# Patient Record
Sex: FEMALE | Race: WHITE | NOT HISPANIC OR LATINO | Employment: OTHER | URBAN - METROPOLITAN AREA
[De-identification: names, ages, dates, MRNs, and addresses within clinical notes are randomized per-mention and may not be internally consistent; named-entity substitution may affect disease eponyms.]

---

## 2019-04-29 ENCOUNTER — HOSPITAL ENCOUNTER (OUTPATIENT)
Dept: LAB | Facility: HOSPITAL | Age: 49
Discharge: HOME | End: 2019-04-29
Attending: INTERNAL MEDICINE

## 2019-04-29 LAB
ALBUMIN SERPL-MCNC: 4.6 G/DL (ref 3.5–5)
ALBUMIN/GLOB SERPL: 1.6 {RATIO} (ref 1.4–2.6)
ALP SERPL-CCNC: 78 U/L (ref 42–98)
ALT SERPL-CCNC: 21 U/L (ref 10–40)
ANION GAP SERPL CALC-SCNC: 15 MMOL/L (ref 8–19)
AST SERPL-CCNC: 24 U/L (ref 15–50)
BASOPHILS # BLD AUTO: 0.05 10*3/UL (ref 0–0.2)
BASOPHILS NFR BLD AUTO: 0.5 % (ref 0–3)
BILIRUB SERPL-MCNC: 0.5 MG/DL (ref 0.2–1.3)
BUN SERPL-MCNC: 21 MG/DL (ref 5–25)
BUN/CREAT SERPL: 34 {RATIO} (ref 6–20)
CALCIUM SERPL-MCNC: 9.8 MG/DL (ref 8.7–10.4)
CHLORIDE SERPL-SCNC: 99 MMOL/L (ref 99–111)
CONV ABS IMM GRAN: 0.03 10*3/UL (ref 0–0.2)
CONV CO2: 27 MMOL/L (ref 22–32)
CONV IMMATURE GRAN: 0.3 % (ref 0–1.8)
CONV TOTAL PROTEIN: 7.4 G/DL (ref 6.3–8.2)
CREAT UR-MCNC: 0.61 MG/DL (ref 0.5–0.9)
DEPRECATED RDW RBC AUTO: 52.3 FL (ref 36.4–46.3)
EOSINOPHIL # BLD AUTO: 0.59 10*3/UL (ref 0–0.7)
EOSINOPHIL # BLD AUTO: 5.3 % (ref 0–7)
ERYTHROCYTE [DISTWIDTH] IN BLOOD BY AUTOMATED COUNT: 14.6 % (ref 11.7–14.4)
ERYTHROCYTE [SEDIMENTATION RATE] IN BLOOD: 8 MM/H (ref 0–20)
GFR SERPLBLD BASED ON 1.73 SQ M-ARVRAT: >60 ML/MIN/{1.73_M2}
GLOBULIN UR ELPH-MCNC: 2.8 G/DL (ref 2–3.5)
GLUCOSE SERPL-MCNC: 92 MG/DL (ref 65–99)
HBA1C MFR BLD: 13 G/DL (ref 12–16)
HCT VFR BLD AUTO: 40.1 % (ref 37–47)
LYMPHOCYTES # BLD AUTO: 3.87 10*3/UL (ref 1–5)
MCH RBC QN AUTO: 31.5 PG (ref 27–31)
MCHC RBC AUTO-ENTMCNC: 32.4 G/DL (ref 33–37)
MCV RBC AUTO: 97.1 FL (ref 81–99)
MONOCYTES # BLD AUTO: 0.64 10*3/UL (ref 0.2–1.2)
MONOCYTES NFR BLD AUTO: 5.8 % (ref 3–10)
NEUTROPHILS # BLD AUTO: 5.86 10*3/UL (ref 2–8)
NEUTROPHILS NFR BLD AUTO: 53 % (ref 30–85)
NRBC CBCN: 0 % (ref 0–0.7)
OSMOLALITY SERPL CALC.SUM OF ELEC: 287 MOSM/KG (ref 273–304)
PLATELET # BLD AUTO: 304 10*3/UL (ref 130–400)
PMV BLD AUTO: 10.5 FL (ref 9.4–12.3)
POTASSIUM SERPL-SCNC: 4.3 MMOL/L (ref 3.5–5.3)
RBC # BLD AUTO: 4.13 10*6/UL (ref 4.2–5.4)
SODIUM SERPL-SCNC: 137 MMOL/L (ref 135–147)
VARIANT LYMPHS NFR BLD MANUAL: 35.1 % (ref 20–45)
WBC # BLD AUTO: 11.04 10*3/UL (ref 4.8–10.8)

## 2019-05-07 ENCOUNTER — HOSPITAL ENCOUNTER (OUTPATIENT)
Dept: MAMMOGRAPHY | Facility: HOSPITAL | Age: 49
Discharge: HOME | End: 2019-05-07

## 2021-09-17 ENCOUNTER — LAB (OUTPATIENT)
Dept: LAB | Facility: HOSPITAL | Age: 51
End: 2021-09-17

## 2021-09-17 ENCOUNTER — TRANSCRIBE ORDERS (OUTPATIENT)
Dept: ADMINISTRATIVE | Facility: HOSPITAL | Age: 51
End: 2021-09-17

## 2021-09-17 DIAGNOSIS — M13.0 POLYARTHROPATHY: ICD-10-CM

## 2021-09-17 DIAGNOSIS — E55.9 VITAMIN D DEFICIENCY: ICD-10-CM

## 2021-09-17 DIAGNOSIS — Z79.899 ENCOUNTER FOR LONG-TERM (CURRENT) USE OF OTHER MEDICATIONS: ICD-10-CM

## 2021-09-17 DIAGNOSIS — M05.79 SEROPOSITIVE RHEUMATOID ARTHRITIS OF MULTIPLE SITES (HCC): ICD-10-CM

## 2021-09-17 DIAGNOSIS — M05.79 SEROPOSITIVE RHEUMATOID ARTHRITIS OF MULTIPLE SITES (HCC): Primary | ICD-10-CM

## 2021-09-17 LAB
ALBUMIN SERPL-MCNC: 4.3 G/DL (ref 3.5–5.2)
ALBUMIN/GLOB SERPL: 1.3 G/DL
ALP SERPL-CCNC: 92 U/L (ref 39–117)
ALT SERPL W P-5'-P-CCNC: 6 U/L (ref 1–33)
ANION GAP SERPL CALCULATED.3IONS-SCNC: 13.8 MMOL/L (ref 5–15)
AST SERPL-CCNC: 13 U/L (ref 1–32)
BASOPHILS # BLD AUTO: 0.05 10*3/MM3 (ref 0–0.2)
BASOPHILS NFR BLD AUTO: 0.5 % (ref 0–1.5)
BILIRUB SERPL-MCNC: 0.2 MG/DL (ref 0–1.2)
BUN SERPL-MCNC: 12 MG/DL (ref 6–20)
BUN/CREAT SERPL: 34.3 (ref 7–25)
CALCIUM SPEC-SCNC: 9.9 MG/DL (ref 8.6–10.5)
CHLORIDE SERPL-SCNC: 102 MMOL/L (ref 98–107)
CO2 SERPL-SCNC: 24.2 MMOL/L (ref 22–29)
CREAT SERPL-MCNC: 0.35 MG/DL (ref 0.57–1)
DEPRECATED RDW RBC AUTO: 47.4 FL (ref 37–54)
EOSINOPHIL # BLD AUTO: 0.19 10*3/MM3 (ref 0–0.4)
EOSINOPHIL NFR BLD AUTO: 1.8 % (ref 0.3–6.2)
ERYTHROCYTE [DISTWIDTH] IN BLOOD BY AUTOMATED COUNT: 15.2 % (ref 12.3–15.4)
ERYTHROCYTE [SEDIMENTATION RATE] IN BLOOD: 59 MM/HR (ref 0–20)
GFR SERPL CREATININE-BSD FRML MDRD: >150 ML/MIN/1.73
GLOBULIN UR ELPH-MCNC: 3.2 GM/DL
GLUCOSE SERPL-MCNC: 96 MG/DL (ref 65–99)
HCT VFR BLD AUTO: 35.5 % (ref 34–46.6)
HGB BLD-MCNC: 11.7 G/DL (ref 12–15.9)
IMM GRANULOCYTES # BLD AUTO: 0.09 10*3/MM3 (ref 0–0.05)
IMM GRANULOCYTES NFR BLD AUTO: 0.8 % (ref 0–0.5)
LYMPHOCYTES # BLD AUTO: 1.99 10*3/MM3 (ref 0.7–3.1)
LYMPHOCYTES NFR BLD AUTO: 18.4 % (ref 19.6–45.3)
MCH RBC QN AUTO: 28.4 PG (ref 26.6–33)
MCHC RBC AUTO-ENTMCNC: 33 G/DL (ref 31.5–35.7)
MCV RBC AUTO: 86.2 FL (ref 79–97)
MONOCYTES # BLD AUTO: 0.58 10*3/MM3 (ref 0.1–0.9)
MONOCYTES NFR BLD AUTO: 5.4 % (ref 5–12)
NEUTROPHILS NFR BLD AUTO: 7.93 10*3/MM3 (ref 1.7–7)
NEUTROPHILS NFR BLD AUTO: 73.1 % (ref 42.7–76)
NRBC BLD AUTO-RTO: 0 /100 WBC (ref 0–0.2)
PLATELET # BLD AUTO: 457 10*3/MM3 (ref 140–450)
PMV BLD AUTO: 10.5 FL (ref 6–12)
POTASSIUM SERPL-SCNC: 4.2 MMOL/L (ref 3.5–5.2)
PROT SERPL-MCNC: 7.5 G/DL (ref 6–8.5)
RBC # BLD AUTO: 4.12 10*6/MM3 (ref 3.77–5.28)
SODIUM SERPL-SCNC: 140 MMOL/L (ref 136–145)
WBC # BLD AUTO: 10.83 10*3/MM3 (ref 3.4–10.8)

## 2021-09-17 PROCEDURE — 85025 COMPLETE CBC W/AUTO DIFF WBC: CPT

## 2021-09-17 PROCEDURE — 36415 COLL VENOUS BLD VENIPUNCTURE: CPT

## 2021-09-17 PROCEDURE — 80053 COMPREHEN METABOLIC PANEL: CPT

## 2021-09-17 PROCEDURE — 85652 RBC SED RATE AUTOMATED: CPT

## 2021-09-17 PROCEDURE — 82306 VITAMIN D 25 HYDROXY: CPT

## 2021-09-17 PROCEDURE — 86480 TB TEST CELL IMMUN MEASURE: CPT

## 2021-09-18 LAB — 25(OH)D3 SERPL-MCNC: 29.2 NG/ML (ref 30–100)

## 2021-09-22 LAB
GAMMA INTERFERON BACKGROUND BLD IA-ACNC: 0.03 IU/ML
M TB IFN-G BLD-IMP: POSITIVE
M TB IFN-G CD4+ BCKGRND COR BLD-ACNC: 0.1 IU/ML
M TB IFN-G CD4+CD8+ BCKGRND COR BLD-ACNC: 0.43 IU/ML
MITOGEN IGNF BLD-ACNC: >10 IU/ML
QUANTIFERON INCUBATION: ABNORMAL
SERVICE CMNT-IMP: ABNORMAL

## 2022-04-04 ENCOUNTER — TELEPHONE (OUTPATIENT)
Dept: SURGERY | Facility: CLINIC | Age: 52
End: 2022-04-04

## 2022-04-04 NOTE — TELEPHONE ENCOUNTER
----- Message from Annabelle Christianson RN sent at 4/4/2022  4:00 PM EDT -----  Regarding: RE: APPT  If next available is 4/20, that's ok.   ----- Message -----  From: Patricio Mota RegSched Rep  Sent: 4/4/2022   3:49 PM EDT  To: Annabelle Christianson RN  Subject: APPT                                             SAMARA DUMONT REF PT TO DR HERNANDEZ FOR BREAST LUMP AND BLOODY NIPPLE DISCHARGE, RECORDS SCANNED IN [facility], DUE TO DR HERNANDEZ NEXT AVAILABLE APPT IS 4/20/22, PLEASE ADVISE FOR AN APPT.

## 2022-04-05 ENCOUNTER — TELEPHONE (OUTPATIENT)
Dept: SURGERY | Facility: CLINIC | Age: 52
End: 2022-04-05

## 2022-04-05 NOTE — TELEPHONE ENCOUNTER
Patient said her PCP told her to call and let us know that her culture came back positive for an infection, and that her PCP started her on an antibiotic.  She told the HUB she anemic.  She has an appointment with Dr. Beaulieu 04/20/22.

## 2022-04-20 ENCOUNTER — OFFICE VISIT (OUTPATIENT)
Dept: SURGERY | Facility: CLINIC | Age: 52
End: 2022-04-20

## 2022-04-20 VITALS — HEIGHT: 62 IN | WEIGHT: 209 LBS | RESPIRATION RATE: 15 BRPM | BODY MASS INDEX: 38.46 KG/M2

## 2022-04-20 DIAGNOSIS — N63.20 MASS OF LEFT BREAST, UNSPECIFIED QUADRANT: Primary | ICD-10-CM

## 2022-04-20 PROBLEM — F41.9 ANXIETY: Status: ACTIVE | Noted: 2017-08-15

## 2022-04-20 PROBLEM — F31.9 BIPOLAR DISORDER: Status: ACTIVE | Noted: 2017-08-15

## 2022-04-20 PROBLEM — Z78.0 MENOPAUSE: Status: ACTIVE | Noted: 2017-12-01

## 2022-04-20 PROBLEM — E66.9 OBESITY: Status: ACTIVE | Noted: 2017-08-15

## 2022-04-20 PROCEDURE — 99203 OFFICE O/P NEW LOW 30 MIN: CPT | Performed by: SURGERY

## 2022-04-20 RX ORDER — CLINDAMYCIN HYDROCHLORIDE 300 MG/1
CAPSULE ORAL
COMMUNITY
Start: 2022-04-04 | End: 2023-03-10

## 2022-04-20 RX ORDER — DIPHENHYDRAMINE HYDROCHLORIDE 25 MG/1
CAPSULE ORAL
COMMUNITY
Start: 2022-04-18 | End: 2023-01-13

## 2022-04-20 RX ORDER — TIZANIDINE 4 MG/1
TABLET ORAL
COMMUNITY

## 2022-04-20 RX ORDER — FOLIC ACID 1 MG/1
TABLET ORAL
COMMUNITY
End: 2023-01-13

## 2022-04-20 RX ORDER — ARIPIPRAZOLE 2 MG/1
TABLET ORAL
COMMUNITY
Start: 2022-02-18 | End: 2023-01-13 | Stop reason: DRUGHIGH

## 2022-04-20 RX ORDER — ARIPIPRAZOLE 5 MG/1
TABLET ORAL
COMMUNITY

## 2022-04-20 RX ORDER — ISONIAZID 300 MG/1
TABLET ORAL
COMMUNITY
Start: 2022-04-05 | End: 2023-01-13

## 2022-04-20 RX ORDER — ESCITALOPRAM OXALATE 20 MG/1
TABLET ORAL
COMMUNITY
Start: 2022-03-17

## 2022-04-20 RX ORDER — CELECOXIB 200 MG/1
200 CAPSULE ORAL DAILY
COMMUNITY

## 2022-04-20 RX ORDER — ZOLPIDEM TARTRATE 12.5 MG/1
TABLET, FILM COATED, EXTENDED RELEASE ORAL
COMMUNITY
End: 2023-01-13

## 2022-04-20 NOTE — PROGRESS NOTES
Chief Complaint: Breast Mass    Subjective         History of Present Illness  Judith Azevedo is a 52 y.o. female presents to [facility] GENERAL SURGERY to be seen for bloody nipple discharge in the left breast that is coming from multiple ducts.  She would not be eligible for galactogram.  She had a mammogram and ultrasound both which showed no masses or lesions.  Concerned that she has a lump at 6:00 in the left breast however this feels to be dense tissue to me and is not an defined mass that would be amenable to biopsy.  I have recommended an MRI to her given the bloody nipple discharge coming from multiple ducts.    US Breast Left Limited (03/24/2022 10:32)      Mammo Diagnostic Bilateral With CAD (03/24/2022 10:26)    Objective     History reviewed. No pertinent past medical history.    History reviewed. No pertinent surgical history.      Current Outpatient Medications:   •  ARIPiprazole (ABILIFY) 2 MG tablet, , Disp: , Rfl:   •  ARIPiprazole (ABILIFY) 5 MG tablet, aripiprazole 5 mg tablet  Take 1 tablet every day by oral route., Disp: , Rfl:   •  celecoxib (CeleBREX) 200 MG capsule, Take  by mouth., Disp: , Rfl:   •  clindamycin (CLEOCIN) 300 MG capsule, , Disp: , Rfl:   •  escitalopram (LEXAPRO) 20 MG tablet, , Disp: , Rfl:   •  folic acid (FOLVITE) 1 MG tablet, folic acid 1 mg tablet  Take 1 tablet every day by oral route for 30 days., Disp: , Rfl:   •  FOLIC ACID PO, Take  by mouth., Disp: , Rfl:   •  isoniazid (NYDRAZID) 300 MG tablet, , Disp: , Rfl:   •  tiZANidine (ZANAFLEX) 4 MG tablet, tizanidine 4 mg tablet  Take 1 tablet by mouth four times a day as needed, Disp: , Rfl:   •  vitamin B-6 (PYRIDOXINE) 25 MG tablet, , Disp: , Rfl:   •  zolpidem CR (AMBIEN CR) 12.5 MG CR tablet, Take  by mouth., Disp: , Rfl:     No Known Allergies     History reviewed. No pertinent family history.    Social History     Socioeconomic History   • Marital status:    Tobacco Use   • Smoking status:  "Current Every Day Smoker     Packs/day: 0.50   • Smokeless tobacco: Never Used   Vaping Use   • Vaping Use: Never used       Vital Signs:   Resp 15   Ht 157.5 cm (62\")   Wt 94.8 kg (209 lb)   BMI 38.23 kg/m²    Review of Systems    Physical Exam  Vitals and nursing note reviewed.   Constitutional:       Appearance: Normal appearance.   HENT:      Head: Normocephalic and atraumatic.   Eyes:      Extraocular Movements: Extraocular movements intact.      Pupils: Pupils are equal, round, and reactive to light.   Cardiovascular:      Pulses: Normal pulses.   Pulmonary:      Effort: Pulmonary effort is normal. No accessory muscle usage or respiratory distress.   Chest:   Breasts:      Right: No inverted nipple, mass, nipple discharge, skin change, tenderness, axillary adenopathy or supraclavicular adenopathy.      Left: Nipple discharge and tenderness present. No inverted nipple, mass, skin change, axillary adenopathy or supraclavicular adenopathy.        Comments: Bloody nipple discharge multiple ducts of the left breast.  Abdominal:      General: Abdomen is flat.      Palpations: Abdomen is soft.      Tenderness: There is no abdominal tenderness. There is no guarding.   Musculoskeletal:         General: No swelling, tenderness or deformity.      Cervical back: Neck supple.   Lymphadenopathy:      Upper Body:      Right upper body: No supraclavicular or axillary adenopathy.      Left upper body: No supraclavicular or axillary adenopathy.   Skin:     General: Skin is warm and dry.   Neurological:      General: No focal deficit present.      Mental Status: She is alert and oriented to person, place, and time.   Psychiatric:         Mood and Affect: Mood normal.         Thought Content: Thought content normal.          Result Review :               Assessment and Plan    Diagnoses and all orders for this visit:    1. Mass of left breast, unspecified quadrant (Primary)    Will plan for breast MRI with follow-up after is " completed.    Follow Up   Return in about 3 weeks (around 5/11/2022) for Followup after imaging study complete.  Patient was given instructions and counseling regarding her condition or for health maintenance advice. Please see specific information pulled into the AVS if appropriate.         This document has been electronically signed by Lyndsay Beaulieu MD  April 20, 2022 11:07 EDT

## 2022-04-28 ENCOUNTER — TELEPHONE (OUTPATIENT)
Dept: UROLOGY | Facility: CLINIC | Age: 52
End: 2022-04-28

## 2022-04-28 DIAGNOSIS — N64.52 NIPPLE DISCHARGE: Primary | ICD-10-CM

## 2022-04-28 NOTE — TELEPHONE ENCOUNTER
Pt was seen and told she needs MRI.  No one has contacted her and there are no orders in the chart.  Please contact the pt.

## 2022-05-05 ENCOUNTER — TELEPHONE (OUTPATIENT)
Dept: SURGERY | Facility: CLINIC | Age: 52
End: 2022-05-05

## 2022-05-05 NOTE — TELEPHONE ENCOUNTER
Pt is scheduled for an MRI on 5/11. She is claustrophobic and is asking for a premed for anxiety. Pharmacy has been verified.

## 2022-05-10 DIAGNOSIS — F41.9 ANXIETY: Primary | ICD-10-CM

## 2022-05-10 RX ORDER — LORAZEPAM 0.5 MG/1
0.5 TABLET ORAL ONCE
Qty: 1 TABLET | Refills: 0 | Status: SHIPPED | OUTPATIENT
Start: 2022-05-10 | End: 2022-05-10

## 2022-05-11 ENCOUNTER — APPOINTMENT (OUTPATIENT)
Dept: MRI IMAGING | Facility: HOSPITAL | Age: 52
End: 2022-05-11

## 2022-05-20 ENCOUNTER — HOSPITAL ENCOUNTER (OUTPATIENT)
Dept: MRI IMAGING | Facility: HOSPITAL | Age: 52
Discharge: HOME | End: 2022-05-20
Admitting: SURGERY

## 2022-05-20 DIAGNOSIS — N64.52 NIPPLE DISCHARGE: ICD-10-CM

## 2022-05-20 PROCEDURE — 77049 MRI BREAST C-+ W/CAD BI: CPT

## 2022-05-20 PROCEDURE — A9577 INJ MULTIHANCE: HCPCS | Performed by: SURGERY

## 2022-05-20 PROCEDURE — 0 GADOBENATE DIMEGLUMINE 529 MG/ML SOLUTION: Performed by: SURGERY

## 2022-05-20 RX ADMIN — GADOBENATE DIMEGLUMINE 20 ML: 529 INJECTION, SOLUTION INTRAVENOUS at 11:15

## 2022-05-24 PROBLEM — N64.52 NIPPLE DISCHARGE: Status: ACTIVE | Noted: 2022-05-24

## 2022-05-24 RX ORDER — MULTIVIT WITH MINERALS/LUTEIN
TABLET ORAL
COMMUNITY
Start: 2022-04-25 | End: 2023-03-14

## 2022-05-24 RX ORDER — FERROUS SULFATE 325(65) MG
TABLET ORAL
COMMUNITY
Start: 2022-04-25 | End: 2023-03-10

## 2022-05-25 ENCOUNTER — TELEPHONE (OUTPATIENT)
Dept: SURGERY | Facility: CLINIC | Age: 52
End: 2022-05-25

## 2022-06-11 NOTE — PROGRESS NOTES
Chief Complaint: Follow-up    Subjective         History of Present Illness  Judith Azevedo is a 52 y.o. female presents to [facility] GENER[facility] SURGERY to be seen for bloody nipple discharge in the left breast that is coming from multiple ducts.  She would not be eligible for galactogram.  She had a mammogram and ultrasound both which showed no masses or lesions.  Concerned that she has a lump at 6:00 in the left breast however this feels to be dense tissue to me and is not an defined mass that would be amenable to biopsy.  I have recommended an MRI to her given the bloody nipple discharge coming from multiple ducts.    US Breast Left Limited (03/24/2022 10:32)      Mammo Diagnostic Bilateral With CAD (03/24/2022 10:26)    She had MRI and the results are shown below:    Narrative & Impression   PROCEDURE:  MRI BREAST BILATERAL W WO CONTRAST     COMPARISON: [facility], US, US BREAST LEFT LIMITED, 5/07/2019, 14:02.  Other, MG,   DIGITAL DIAG KILEY W/CAD, 4/22/2019, 8:28.  Other, MG, DIGITAL SCREENING W/CAD, 4/17/2018, 15:58.    Other, MG, MAMMO DIAGNOSTIC DIGITAL TOMOSYNTHESIS BILATERAL W CAD, 3/24/2022, 9:18.  Other, MG,   MAMMO SCREENING DIGITAL TOMOSYNTHESIS BILATERAL W CAD, 7/01/2020, 11:52.  Other, US, US BREAST LEFT   LIMITED, 3/24/2022, 9:26.     INDICATIONS:  Left breast niipple greenish-bloody discharge,  (Age >= 40y) with palpable concern, x   ~ six months.  History of rheumatoid arthritis.     CONTRAST:      20ML  Multihance I.V.     TECHNIQUE:    Breast MRI was performed using dynamic intravenous gadolinium infusion, thin sections,   and a dedicated breast coil.  Contrast enhancement analysis was assisted by computer-aided   detection.       AMOUNT OF FIBROGLANDULAR TISSUE:        Almost entirely fat     BACKGROUND PARENCHYMAL ENHANCEMENT:       Minimal; symmetric     FINDINGS:  The contrast bolus is adequate.     No suspicious mass or non mass enhancement is demonstrated above  background parenchymal   enhancement.  A vitamin-E capsule is present at approximately the 6 o'clock position in the   junction of the middle and posterior 1/3 of the left breast to indicate the reported lump.  No   abnormality is seen in this area which is predominately fatty.  No masses are seen.  No duct   ectasia or areas of T1 signal hyper intensity are demonstrated.  No nipple retraction or skin   thickening.  No axillary or internal mammary adenopathy on either side.  The bone marrow signal   characteristics in the visualized anterior bony thorax are within normal limits.     IMPRESSION:  Normal MRI of the breasts with and without contrast.     BIRADS:  DIAGNOSTIC CATEGORY 1--NEGATIVE.       RECOMMENDATION(S):  ROUTINE MAMMOGRAM AND CLINICAL EVALUATION IN 12 MONTHS.       Her symptoms have resolved with abx.     Objective     History reviewed. No pertinent past medical history.    History reviewed. No pertinent surgical history.      Current Outpatient Medications:   •  ARIPiprazole (ABILIFY) 2 MG tablet, , Disp: , Rfl:   •  ARIPiprazole (ABILIFY) 5 MG tablet, aripiprazole 5 mg tablet  Take 1 tablet every day by oral route., Disp: , Rfl:   •  celecoxib (CeleBREX) 200 MG capsule, Take  by mouth., Disp: , Rfl:   •  clindamycin (CLEOCIN) 300 MG capsule, , Disp: , Rfl:   •  escitalopram (LEXAPRO) 20 MG tablet, , Disp: , Rfl:   •  FeroSul 325 (65 Fe) MG tablet, , Disp: , Rfl:   •  folic acid (FOLVITE) 1 MG tablet, folic acid 1 mg tablet  Take 1 tablet every day by oral route for 30 days., Disp: , Rfl:   •  FOLIC ACID PO, Take  by mouth., Disp: , Rfl:   •  isoniazid (NYDRAZID) 300 MG tablet, , Disp: , Rfl:   •  tiZANidine (ZANAFLEX) 4 MG tablet, tizanidine 4 mg tablet  Take 1 tablet by mouth four times a day as needed, Disp: , Rfl:   •  vitamin B-6 (PYRIDOXINE) 25 MG tablet, , Disp: , Rfl:   •  vitamin C (ASCORBIC ACID) 250 MG tablet, , Disp: , Rfl:   •  zolpidem CR (AMBIEN CR) 12.5 MG CR tablet, Take  by mouth.,  "Disp: , Rfl:     No Known Allergies     History reviewed. No pertinent family history.    Social History     Socioeconomic History   • Marital status:    Tobacco Use   • Smoking status: Current Every Day Smoker     Packs/day: 0.50   • Smokeless tobacco: Never Used   Vaping Use   • Vaping Use: Never used       Vital Signs:   Resp 17   Ht 157.5 cm (62\")   Wt 91.6 kg (202 lb)   BMI 36.95 kg/m²    Review of Systems     Physical Exam  Vitals and nursing note reviewed.   Constitutional:       Appearance: Normal appearance.   HENT:      Head: Normocephalic and atraumatic.   Eyes:      Extraocular Movements: Extraocular movements intact.      Pupils: Pupils are equal, round, and reactive to light.   Cardiovascular:      Pulses: Normal pulses.   Pulmonary:      Effort: Pulmonary effort is normal. No accessory muscle usage or respiratory distress.   Chest:   Breasts:      Right: No inverted nipple, mass, nipple discharge, skin change, tenderness, axillary adenopathy or supraclavicular adenopathy.      Left: No inverted nipple, mass, nipple discharge, skin change, tenderness, axillary adenopathy or supraclavicular adenopathy.       Abdominal:      General: Abdomen is flat.      Palpations: Abdomen is soft.      Tenderness: There is no abdominal tenderness. There is no guarding.   Musculoskeletal:         General: No swelling, tenderness or deformity.      Cervical back: Neck supple.   Lymphadenopathy:      Upper Body:      Right upper body: No supraclavicular or axillary adenopathy.      Left upper body: No supraclavicular or axillary adenopathy.   Skin:     General: Skin is warm and dry.   Neurological:      General: No focal deficit present.      Mental Status: She is alert and oriented to person, place, and time.   Psychiatric:         Mood and Affect: Mood normal.         Thought Content: Thought content normal.            Assessment and Plan    Diagnoses and all orders for this visit:    1. Nipple discharge " (Primary)  -     Ambulatory Referral to Plastic Surgery    MRI was normal  Pt will be referred to Plastics for breast reduction    Follow Up   Return if symptoms worsen or fail to improve.  Patient was given instructions and counseling regarding her condition or for health maintenance advice. Please see specific information pulled into the AVS if appropriate.         This document has been electronically signed by Lyndsay Beaulieu MD  June 13, 2022 11:07 EDT

## 2022-06-13 ENCOUNTER — OFFICE VISIT (OUTPATIENT)
Dept: SURGERY | Facility: CLINIC | Age: 52
End: 2022-06-13

## 2022-06-13 VITALS — BODY MASS INDEX: 37.17 KG/M2 | RESPIRATION RATE: 17 BRPM | HEIGHT: 62 IN | WEIGHT: 202 LBS

## 2022-06-13 DIAGNOSIS — N64.52 NIPPLE DISCHARGE: Primary | ICD-10-CM

## 2022-06-13 PROCEDURE — 99212 OFFICE O/P EST SF 10 MIN: CPT | Performed by: SURGERY

## 2022-06-27 NOTE — PROGRESS NOTES
"Chief Complaint  Consult (Bilateral Breast Reduction)    Subjective          History of Present Illness  Judith Azevedo is a 52 y.o. female who presents to [facility] RECONSTRU[facility] SURGERY as a consult from Dr. Beaulieu and would like to discuss breast reduction.  Her current bra size is a 40E cup.  She would like to be a  C cup.  She patient does not have a personal or family history of breast cancer.  Neck, shoulders, and upper back pain present for 20 years.  Conservative treatments of , with little or temporary relief.  Experiences rashes, treats with baby powder.  Trouble sleeping, cannot get comfortable.  Trouble exercising, cannot do activities that she would like to do, generally has to wear many sports bras and has to special order bras. Breast tug and pull which causes pain with any tugging or aerobic activity.  Recommendations for today of breast reduction.    MRI bilateral breast 5/20/22 due to green discharge from left nipple, dx: infected milk duct and completed antibiotic  Mammogram 3/24/2022 benign then MRI was benign   Nipple discharge has stopped and has no problems now. Has completed medications for TB after taking Enbrel. Is not on Enbrel or Methotrexate now.   Allergies: Patient has no known allergies.  Allergies Reconciled.    Review of Systems  All system were reviewed and were negative, except the ones noted above.     Review of Systems     Objective     /82 (BP Location: Left arm, Patient Position: Sitting)   Pulse 61   Temp 98 °F (36.7 °C) (Temporal)   Ht 157.5 cm (62\")   Wt 92.1 kg (203 lb)   BMI 37.13 kg/m²     Body mass index is 37.13 kg/m².    Physical Exam    Physical exam:  Patient awake, alert, oriented  Respirations are non elaborated  Patient is not tachycardic    Breast exam:   Masses: No masses  Nipple Discharge: No nipple discharge  Breast Symmetry: right breast is larger than left  Axillary Lymphadenopathy: No axillary " lymphadenopathy  SN-N (Right Breast): 38  SN-N (Left Breast):37  SN-IMF (Right Breast):10  SN-IMF (Left Breast):10  N-IMF (Right Breast):16  N-IMF (Left Breast):16    Schnur Scale Score: 575  Body surface area is 1.92 meters squared.    Right breast is larger than left breast, mild erythema over IMFs, bilateral shoulder grooving   Result Review :                Assessment and Plan      Diagnoses and all orders for this visit:    1. Macromastia (Primary)             Plan:    • The patient was given literature in regards to the procedure and encouraged to visit the websites of [employer]PS and [employer].  • Pictures obtained.  • We will have the patient obtain pre-operative clearance from rheumatology, Dr. Phillip.   • We discussed the procedure for bilateral breast reduction under general anesthesia as well as the postoperative recover time and the need for limitation of activities.  The risks of surgery were discussed including bleeding, infection, scarring (for which diagrams were drawn), pain, poor healing, loss of skin and or nipple, change in nipple sensation, inability to breast feed, asymmetry, no guarantee of post-operative cup size.    • I think that this patient is an excellent candidate for a breast reduction.  If feel that this procedure is medically necessary to relieve her symptoms.  I anticipate we will remove 575 grams of tissue per each breast. We will contact the insurance company for pre-authorization.    • This patient has my office number to call with any further questions.       OR: 3 hrs under general anesthesia    Consent: bilateral breast reduction    CPT  39303-14- breast reduction    Follow Up     Return in about 1 month (around 7/29/2022) for Video visit to follow up for breast reduction.    Patient was given instructions and counseling regarding her condition. Please see specific information pulled into the AVS if appropriate.     Ree Walls, APRN  06/29/2022

## 2022-06-29 ENCOUNTER — OFFICE VISIT (OUTPATIENT)
Dept: PLASTIC SURGERY | Facility: CLINIC | Age: 52
End: 2022-06-29

## 2022-06-29 VITALS
SYSTOLIC BLOOD PRESSURE: 145 MMHG | HEIGHT: 62 IN | BODY MASS INDEX: 37.36 KG/M2 | HEART RATE: 61 BPM | DIASTOLIC BLOOD PRESSURE: 82 MMHG | WEIGHT: 203 LBS | TEMPERATURE: 98 F

## 2022-06-29 DIAGNOSIS — N62 MACROMASTIA: Primary | ICD-10-CM

## 2022-06-29 PROCEDURE — 99204 OFFICE O/P NEW MOD 45 MIN: CPT | Performed by: NURSE PRACTITIONER

## 2022-08-04 ENCOUNTER — TELEPHONE (OUTPATIENT)
Dept: PLASTIC SURGERY | Facility: CLINIC | Age: 52
End: 2022-08-04

## 2022-08-04 ENCOUNTER — TELEMEDICINE (OUTPATIENT)
Dept: PLASTIC SURGERY | Facility: CLINIC | Age: 52
End: 2022-08-04

## 2022-08-04 DIAGNOSIS — N62 MACROMASTIA: Primary | ICD-10-CM

## 2022-08-04 PROCEDURE — 99214 OFFICE O/P EST MOD 30 MIN: CPT | Performed by: NURSE PRACTITIONER

## 2022-08-04 NOTE — TELEPHONE ENCOUNTER
Could not lvm for patient to set up mychart.  Have called multiple times today for her to get it set up for this visit.  Also sent a message in the [employer] to have her set it up and if she needed help to give us a call.

## 2022-08-04 NOTE — PROGRESS NOTES
Chief Complaint  No chief complaint on file.    Subjective     Mode of Visit: Video  Location of patient: home  Patient consents to use a video/audio connection for medical care today.  The visit included audio and video interaction. No technical issues occurred during this visit.     History of Present Illness  Judith Azevedo is a 52 y.o. female who presents to [facility] REC[facility]STRU[facility] SURGERY as a consult from Dr. Beaulieu and would like to discuss breast reduction.  Her current bra size is a 40E cup.  She would like to be a  C cup.  She patient does not have a personal or family history of breast cancer.  Neck, shoulders, and upper back pain present for 20 years.  Conservative treatments of Nystatin cream prescribed by PCP and baby powder, with little or temporary relief. Trouble sleeping, cannot get comfortable.  Trouble exercising, cannot do activities that she would like to do, generally has to wear many sports bras and has to special order bras. Breast tug and pull which causes pain with any tugging or aerobic activity.  Recommendations for today of breast reduction.    MRI bilateral breast 5/20/22 due to green discharge from left nipple, dx: infected milk duct and completed antibiotic  Mammogram 3/24/2022 benign then MRI was benign   Nipple discharge has stopped and has no problems now. Has completed medications for TB after taking Enbrel. Is not on Enbrel or Methotrexate now.   Allergies: Patient has no known allergies.  Allergies Reconciled.    Review of Systems  All system were reviewed and were negative, except the ones noted above.     Review of Systems     Objective     There were no vitals taken for this visit.    There is no height or weight on file to calculate BMI.    Physical Exam    Physical exam:  Patient awake, alert, oriented  Respirations are non elaborated      Breast exam from 6/29/22    Masses: No masses  Nipple Discharge: No nipple discharge  Breast Symmetry:  right breast is larger than left  Axillary Lymphadenopathy: No axillary lymphadenopathy  SN-N (Right Breast): 38  SN-N (Left Breast):37  SN-IMF (Right Breast):10  SN-IMF (Left Breast):10  N-IMF (Right Breast):16  N-IMF (Left Breast):16    Schnur Scale Score: 575  There is no height or weight on file to calculate BSA.    Right breast is larger than left breast, mild erythema over IMFs, bilateral shoulder grooving   Result Review :                Assessment and Plan      Diagnoses and all orders for this visit:    1. Macromastia (Primary)             Plan:    • The patient was given literature in regards to the procedure and encouraged to visit the websites of [employer]PS and [employer]APS.  • Pictures obtained.  • We will have the patient obtain pre-operative clearance from rheumatology, Dr. Phillip.   • We discussed the procedure for bilateral breast reduction under general anesthesia as well as the postoperative recover time and the need for limitation of activities.  The risks of surgery were discussed including bleeding, infection, scarring (for which diagrams were drawn), pain, poor healing, loss of skin and or nipple, change in nipple sensation, inability to breast feed, asymmetry, no guarantee of post-operative cup size.    • I think that this patient is an excellent candidate for a breast reduction.  If feel that this procedure is medically necessary to relieve her symptoms.  I anticipate we will remove 575 grams of tissue per each breast. We will contact the insurance company for pre-authorization.    • This patient has my office number to call with any further questions.       OR: 3 hrs under general anesthesia    Consent: bilateral breast reduction    CPT  18722-89- breast reduction    Follow Up     No follow-ups on file.    Patient was given instructions and counseling regarding her condition. Please see specific information pulled into the AVS if appropriate.     Ree Walls, APRN  08/04/2022

## 2022-11-23 ENCOUNTER — TELEPHONE (OUTPATIENT)
Dept: PLASTIC SURGERY | Facility: CLINIC | Age: 52
End: 2022-11-23

## 2022-11-23 NOTE — TELEPHONE ENCOUNTER
I left a voicemail for pt to contact me if she wants to schedule with new surgeon. She did not answer and voice mailbox has not been set up.  I tried calling pt x2 but no answer and voicemail box has not been set up.

## 2022-12-15 ENCOUNTER — TRANSCRIBE ORDERS (OUTPATIENT)
Dept: LAB | Facility: HOSPITAL | Age: 52
End: 2022-12-15

## 2022-12-15 ENCOUNTER — LAB (OUTPATIENT)
Dept: LAB | Facility: HOSPITAL | Age: 52
End: 2022-12-15

## 2022-12-15 DIAGNOSIS — E55.9 VITAMIN D DEFICIENCY: ICD-10-CM

## 2022-12-15 DIAGNOSIS — Z79.899 ENCOUNTER FOR LONG-TERM (CURRENT) USE OF OTHER MEDICATIONS: Primary | ICD-10-CM

## 2022-12-15 DIAGNOSIS — M25.50 PAIN IN JOINT, MULTIPLE SITES: ICD-10-CM

## 2022-12-15 DIAGNOSIS — Z79.899 ENCOUNTER FOR LONG-TERM (CURRENT) USE OF OTHER MEDICATIONS: ICD-10-CM

## 2022-12-15 LAB
ALBUMIN SERPL-MCNC: 4.9 G/DL (ref 3.5–5.2)
ALBUMIN/GLOB SERPL: 2 G/DL
ALP SERPL-CCNC: 110 U/L (ref 39–117)
ALT SERPL W P-5'-P-CCNC: 12 U/L (ref 1–33)
ANION GAP SERPL CALCULATED.3IONS-SCNC: 8.3 MMOL/L (ref 5–15)
AST SERPL-CCNC: 17 U/L (ref 1–32)
BASOPHILS # BLD AUTO: 0.05 10*3/MM3 (ref 0–0.2)
BASOPHILS NFR BLD AUTO: 0.4 % (ref 0–1.5)
BILIRUB SERPL-MCNC: 0.3 MG/DL (ref 0–1.2)
BUN SERPL-MCNC: 21 MG/DL (ref 6–20)
BUN/CREAT SERPL: 33.3 (ref 7–25)
CALCIUM SPEC-SCNC: 9.6 MG/DL (ref 8.6–10.5)
CHLORIDE SERPL-SCNC: 103 MMOL/L (ref 98–107)
CO2 SERPL-SCNC: 26.7 MMOL/L (ref 22–29)
CREAT SERPL-MCNC: 0.63 MG/DL (ref 0.57–1)
DEPRECATED RDW RBC AUTO: 64.2 FL (ref 37–54)
EGFRCR SERPLBLD CKD-EPI 2021: 108.2 ML/MIN/1.73
EOSINOPHIL # BLD AUTO: 0.27 10*3/MM3 (ref 0–0.4)
EOSINOPHIL NFR BLD AUTO: 2 % (ref 0.3–6.2)
ERYTHROCYTE [DISTWIDTH] IN BLOOD BY AUTOMATED COUNT: 22.3 % (ref 12.3–15.4)
ERYTHROCYTE [SEDIMENTATION RATE] IN BLOOD: 13 MM/HR (ref 0–20)
GLOBULIN UR ELPH-MCNC: 2.5 GM/DL
GLUCOSE SERPL-MCNC: 104 MG/DL (ref 65–99)
HCT VFR BLD AUTO: 38.3 % (ref 34–46.6)
HGB BLD-MCNC: 11.9 G/DL (ref 12–15.9)
IMM GRANULOCYTES # BLD AUTO: 0.04 10*3/MM3 (ref 0–0.05)
IMM GRANULOCYTES NFR BLD AUTO: 0.3 % (ref 0–0.5)
LYMPHOCYTES # BLD AUTO: 2.71 10*3/MM3 (ref 0.7–3.1)
LYMPHOCYTES NFR BLD AUTO: 20.2 % (ref 19.6–45.3)
MCH RBC QN AUTO: 25.1 PG (ref 26.6–33)
MCHC RBC AUTO-ENTMCNC: 31.1 G/DL (ref 31.5–35.7)
MCV RBC AUTO: 80.6 FL (ref 79–97)
MONOCYTES # BLD AUTO: 0.53 10*3/MM3 (ref 0.1–0.9)
MONOCYTES NFR BLD AUTO: 4 % (ref 5–12)
NEUTROPHILS NFR BLD AUTO: 73.1 % (ref 42.7–76)
NEUTROPHILS NFR BLD AUTO: 9.81 10*3/MM3 (ref 1.7–7)
NRBC BLD AUTO-RTO: 0 /100 WBC (ref 0–0.2)
PLATELET # BLD AUTO: 387 10*3/MM3 (ref 140–450)
PMV BLD AUTO: 10.6 FL (ref 6–12)
POTASSIUM SERPL-SCNC: 4.4 MMOL/L (ref 3.5–5.2)
PROT SERPL-MCNC: 7.4 G/DL (ref 6–8.5)
RBC # BLD AUTO: 4.75 10*6/MM3 (ref 3.77–5.28)
SODIUM SERPL-SCNC: 138 MMOL/L (ref 136–145)
WBC NRBC COR # BLD: 13.41 10*3/MM3 (ref 3.4–10.8)

## 2022-12-15 PROCEDURE — 85025 COMPLETE CBC W/AUTO DIFF WBC: CPT

## 2022-12-15 PROCEDURE — 85652 RBC SED RATE AUTOMATED: CPT

## 2022-12-15 PROCEDURE — 36415 COLL VENOUS BLD VENIPUNCTURE: CPT

## 2022-12-15 PROCEDURE — 80053 COMPREHEN METABOLIC PANEL: CPT

## 2023-01-13 ENCOUNTER — PREP FOR SURGERY (OUTPATIENT)
Dept: OTHER | Facility: HOSPITAL | Age: 53
End: 2023-01-13
Payer: COMMERCIAL

## 2023-01-13 ENCOUNTER — OFFICE VISIT (OUTPATIENT)
Dept: PLASTIC SURGERY | Facility: CLINIC | Age: 53
End: 2023-01-13
Payer: COMMERCIAL

## 2023-01-13 VITALS
WEIGHT: 215.4 LBS | TEMPERATURE: 98.1 F | HEART RATE: 70 BPM | OXYGEN SATURATION: 98 % | SYSTOLIC BLOOD PRESSURE: 127 MMHG | DIASTOLIC BLOOD PRESSURE: 80 MMHG | HEIGHT: 62 IN | BODY MASS INDEX: 39.64 KG/M2

## 2023-01-13 DIAGNOSIS — N62 MACROMASTIA: Primary | ICD-10-CM

## 2023-01-13 DIAGNOSIS — Z01.818 PRE-OPERATIVE EXAMINATION: ICD-10-CM

## 2023-01-13 PROCEDURE — 99203 OFFICE O/P NEW LOW 30 MIN: CPT | Performed by: SURGERY

## 2023-01-13 RX ORDER — CEFAZOLIN SODIUM 2 G/100ML
2 INJECTION, SOLUTION INTRAVENOUS ONCE
Status: CANCELLED | OUTPATIENT
Start: 2023-01-13 | End: 2023-01-13

## 2023-01-13 RX ORDER — MEDROXYPROGESTERONE ACETATE 150 MG/ML
INJECTION, SUSPENSION INTRAMUSCULAR
COMMUNITY
Start: 2022-12-19 | End: 2023-03-10

## 2023-01-13 NOTE — PROGRESS NOTES
"Chief Complaint  Consult (Consult Breast Reduction)    Subjective       History of Present Illness  Judith Azevedo is a 52 y.o. female who presents to [facility] as a consult from Dr. Beaulieu and would like to discuss breast reduction.  Her current bra size is a 40E cup.  She would like to be a  B cup.  She patient does not have a personal or family history of breast cancer.  Neck, shoulders, and upper back pain present for 20 years.  Conservative treatments of Nystatin cream prescribed by PCP and baby powder, with little or temporary relief. Trouble sleeping, cannot get comfortable.  Trouble exercising, cannot do activities that she would like to do, generally has to wear many sports bras and has to special order bras. Breast tug and pull which causes pain with any tugging or aerobic activity.  Recommendations for today of breast reduction.    MRI bilateral breast 5/20/22 due to green discharge from left nipple, dx: infected milk duct and completed antibiotic  Mammogram 3/24/2022 benign then MRI was benign   Nipple discharge has stopped and has no problems now. Has completed medications for TB after taking Enbrel. Is not on Enbrel or Methotrexate now.     Patient has been smoking 1/2 PPD but states she is quitting today.     Allergies: Patient has no known allergies.  Allergies Reconciled.    Review of Systems  All system were reviewed and were negative, except the ones noted above.     Review of Systems     Objective     /80 (BP Location: Left arm)   Pulse 70   Temp 98.1 °F (36.7 °C) (Temporal)   Ht 157.5 cm (62\")   Wt 97.7 kg (215 lb 6.4 oz)   SpO2 98%   BMI 39.40 kg/m²     Body mass index is 39.4 kg/m².    Physical Exam    Physical exam:  Patient awake, alert, oriented  Respirations are non elaborated      Breast exam:    Breast Measurements:    Right Left   A: Sternal notch to nipple distance (cm) 22 22   B: Midsternum to nipple distance (cm) 36 36 "   C: Midclavicle to nipple distance (cm)       D: Nipple to inframammary fold (cm) 15 14   E: Base width (cm) 17 17   F: Inframammary distance (cm)        Right Left   Areolar diameter (cm) 6 6   Nipple diameter (cm)       Superior tissue pinch test (cm)     Breast ptosis (grade 0-3) 3 3       Estimated tissue to be removed: 700 R, 600 L    Body surface area is 1.97 meters squared.    Right breast is larger than left breast, mild erythema over IMFs, bilateral shoulder grooving .  Patient with loss of upper pole fullness.  She has stretch marks that extend onto the upper pole of the breast.  She is hyperpigmentation along the inframammary fold consistent with chronic irritation and maceration.  No discrete breast masses but she has dense breast tissue.  No axillary lymphadenopathy.  No nipple discharge or drainage.                 Assessment and Plan    Discussed smoking with patient and how surgery will not be performed as long as nicotine test come back positive. Advised pt she will be tested before and day of surgery.   Discussed scarring and healing process. Pictures were done in clinic today.      Diagnoses and all orders for this visit:    1. Macromastia (Primary)    2. Pre-operative examination             • Plan:   • Bilateral breast reduction  • Removal of more than 700 g from the right side  • Removal more than 600 g from the left side  •   •   • The patient was given literature in regards to the procedure and encouraged to visit the websites of [employer] and [employer].  • Pictures obtained.  • We will have the patient send the report or undergo a pre-operative mammogram.  • We will have the patient obtain pre-operative clearance.  • We discussed the procedure for bilateral breast reduction under general anesthesia as well as the postoperative recover time and the need for limitation of activities.  The risks of surgery were discussed including bleeding, infection, scarring (for which diagrams were drawn), pain,  poor healing, loss of skin and or nipple, change in nipple sensation, inability to breast feed, asymmetry, no guarantee of post-operative cup size.    • I think that this patient is an excellent candidate for a breast reduction.  I feel that this procedure is medically necessary to relieve her symptoms.   • We will contact the insurance company for pre-authorization.    • This patient has my office number to call with any further questions.   • OR: 3 hrs under general anesthesia  •   • Consent: bilateral breast reduction  •   • CPT  46536-03- breast reduction        I spent 25 minutes caring for Judith on this date of service. This time includes time spent by me in the following activities:performing a medically appropriate examination and/or evaluation , counseling and educating the patient/family/caregiver, documenting information in the medical record, and care coordination        Scribed by David Paez MD, acting as a scribe for David Paez MD, 01/13/23 08:56 EST.  David Paez MD's signature on the note affirms that the note adequately documents the care provided.        Discussion with patient regarding her breast reduction.  She understands that nicotine use will increase the risk for wound complications.  Even being a recent ex smoker will increase the risk versus a non-smoker.  Patient encouraged to quit as soon as possible.  She understands she cannot be guaranteed a specific cup size.  The possibility of altered or loss nipple sensation was discussed with the patient.  The axillary roll is not breast tissue.  It will not be reduced as part of the operation.  This could be addressed through either liposuction or weight loss.  Patient will think about her options, however she is leaning towards weight loss.  Patient is looking forward to being more active once her breast are more normal size.  Consent:    Follow Up     Return for pre op once surgery schd.     Scribed by  Roberta Wang, acting as a scribe for David Paez MD, 01/13/23 08:40 EST.  David Paez MD's signature on the note affirms that the note adequately documents the care provided.      Patient was given instructions and counseling regarding her condition. Please see specific information pulled into the AVS if appropriate.     David Paez MD  01/13/2023

## 2023-01-25 ENCOUNTER — TELEPHONE (OUTPATIENT)
Dept: PLASTIC SURGERY | Facility: CLINIC | Age: 53
End: 2023-01-25
Payer: COMMERCIAL

## 2023-01-25 NOTE — TELEPHONE ENCOUNTER
Called & spoke with patient advised her that she can just walk in for the cotinine test at the lab they are not scheduled. She verbalized understanding.

## 2023-01-25 NOTE — TELEPHONE ENCOUNTER
Caller: Judith Azevedo    Relationship to patient: SELF     Best call back number: [phone]    Patient is needing: PATIENT IS NEEDING TO SCHEDULE NICOTINE TEST FOR 02.15.23 INSTEAD OF 02.13.23 DUE TO PRIOR DR APPOINTMENT         ”

## 2023-02-15 ENCOUNTER — LAB (OUTPATIENT)
Dept: LAB | Facility: HOSPITAL | Age: 53
End: 2023-02-15
Payer: COMMERCIAL

## 2023-02-15 DIAGNOSIS — Z01.818 PRE-OPERATIVE EXAMINATION: ICD-10-CM

## 2023-02-15 LAB — COTININE UR-MCNC: NEGATIVE NG/ML

## 2023-02-15 PROCEDURE — G0480 DRUG TEST DEF 1-7 CLASSES: HCPCS

## 2023-03-07 PROBLEM — N62 MACROMASTIA: Status: ACTIVE | Noted: 2023-03-07

## 2023-03-07 NOTE — PROGRESS NOTES
"Pre-op Exam (Pre Op 3/15/23)            History of Present Illness  Judith Azevedo is a 52 y.o. female who presents to [facility] for Pre-Operative Examination for bilateral breast reduction scheduled for 3/15/23.    Pt presents today for Pre-op for BBR on 3/15/23.  Pt states she would like to be a C cup after surgery.  She understands she cannot be guaranteed a specific cup size.    Subjective        Patient has no known allergies.  Allergies Reconciled.    Review of Systems   All review of system has been reviewed and it  is negative except the ones note above.     Objective     /86 (BP Location: Right arm, Patient Position: Sitting, Cuff Size: Adult)   Pulse 68   Temp 97.8 °F (36.6 °C) (Temporal)   Ht 157.5 cm (62\")   Wt 100 kg (221 lb)   SpO2 95%   BMI 40.42 kg/m²     Body mass index is 40.42 kg/m².    Physical Exam   Cardiovascular: Normal rate.     Pulmonary/Chest  Effort normal.   Breast  No interval change on breast examination    Result Review :                Assessment and Plan      Diagnoses and all orders for this visit:    1. Pre-operative examination (Primary)  -     cephalexin (KEFLEX) 500 MG capsule; Take 1 capsule by mouth 4 (Four) Times a Day for 5 days.  Dispense: 20 capsule; Refill: 0  -     promethazine (PHENERGAN) 25 MG tablet; Take 1 tablet by mouth Every 6 (Six) Hours As Needed for Nausea or Vomiting.  Dispense: 20 tablet; Refill: 0  -     oxyCODONE-acetaminophen (Percocet) 5-325 MG per tablet; Take 1 tablet by mouth Every 6 (Six) Hours As Needed for Moderate Pain.  Dispense: 28 tablet; Refill: 0  -     Nicotine Screen, Urine - Urine, Clean Catch; Future    Symptomatic macromastia    Plan:  Bilateral breast reduction  • Removal of more than 700 g from the right side  • Removal more than 600 g from the left side      Discussed downtime after surgery.  Discussed will need to take it easy for the first 3 days up to a week.  Discussed " will need sports bra that latches in the front; will wear day/night for the first month.  Discussed surgery time (2 hrs)  Post medications sent into pharmacy at today's appt  Pt aware of nicotine test due to do today after appt.  Advised pt will have drains and will remove in couple of days after surgery.  Discussed breast will normalize over months post op.  Discussed swelling will take up to 3 months to dissolve.  Discussed final shape for breast is up to a year.  She understands the axillary roll will not be reduced as part of this operation, is not breast tissue.  Patient states she is planning on losing weight and this will help the axillary roll.    Scribed by Roberta Wang, acting as a scribe for David Paez MD, 03/10/23 10:36 EST.  David Paez MD's signature on the note affirms that the note adequately documents the care provided.    Follow Up     Post op 3/23/23    Return post op, one week after.    Patient was given instructions and counseling regarding her condition. Please see specific information pulled into the AVS if appropriate.     David Paez MD  03/10/2023

## 2023-03-10 ENCOUNTER — LAB (OUTPATIENT)
Dept: LAB | Facility: HOSPITAL | Age: 53
End: 2023-03-10
Payer: COMMERCIAL

## 2023-03-10 ENCOUNTER — OFFICE VISIT (OUTPATIENT)
Dept: PLASTIC SURGERY | Facility: CLINIC | Age: 53
End: 2023-03-10
Payer: COMMERCIAL

## 2023-03-10 VITALS
WEIGHT: 221 LBS | DIASTOLIC BLOOD PRESSURE: 86 MMHG | TEMPERATURE: 97.8 F | OXYGEN SATURATION: 95 % | HEIGHT: 62 IN | BODY MASS INDEX: 40.67 KG/M2 | SYSTOLIC BLOOD PRESSURE: 137 MMHG | HEART RATE: 68 BPM

## 2023-03-10 DIAGNOSIS — Z01.818 PRE-OPERATIVE EXAMINATION: Primary | ICD-10-CM

## 2023-03-10 DIAGNOSIS — Z01.818 PRE-OPERATIVE EXAMINATION: ICD-10-CM

## 2023-03-10 LAB — COTININE UR-MCNC: NEGATIVE NG/ML

## 2023-03-10 PROCEDURE — G0480 DRUG TEST DEF 1-7 CLASSES: HCPCS

## 2023-03-10 PROCEDURE — 1159F MED LIST DOCD IN RCRD: CPT | Performed by: SURGERY

## 2023-03-10 PROCEDURE — 99213 OFFICE O/P EST LOW 20 MIN: CPT | Performed by: SURGERY

## 2023-03-10 PROCEDURE — 1160F RVW MEDS BY RX/DR IN RCRD: CPT | Performed by: SURGERY

## 2023-03-10 RX ORDER — CETIRIZINE HYDROCHLORIDE 10 MG/1
TABLET ORAL
COMMUNITY
Start: 2023-02-15 | End: 2023-03-14

## 2023-03-10 RX ORDER — PROMETHAZINE HYDROCHLORIDE 25 MG/1
25 TABLET ORAL EVERY 6 HOURS PRN
Qty: 20 TABLET | Refills: 0 | Status: SHIPPED | OUTPATIENT
Start: 2023-03-10 | End: 2023-03-14

## 2023-03-10 RX ORDER — OXYCODONE HYDROCHLORIDE AND ACETAMINOPHEN 5; 325 MG/1; MG/1
1 TABLET ORAL EVERY 6 HOURS PRN
Qty: 28 TABLET | Refills: 0 | Status: SHIPPED | OUTPATIENT
Start: 2023-03-10 | End: 2023-03-14

## 2023-03-10 RX ORDER — MEDROXYPROGESTERONE ACETATE 150 MG/ML
INJECTION, SUSPENSION INTRAMUSCULAR
COMMUNITY
End: 2023-03-14

## 2023-03-10 RX ORDER — CETIRIZINE HYDROCHLORIDE 10 MG/1
TABLET ORAL EVERY 24 HOURS
COMMUNITY

## 2023-03-10 RX ORDER — CEPHALEXIN 500 MG/1
500 CAPSULE ORAL 4 TIMES DAILY
Qty: 20 CAPSULE | Refills: 0 | Status: SHIPPED | OUTPATIENT
Start: 2023-03-10 | End: 2023-03-15

## 2023-03-14 RX ORDER — PROMETHAZINE HYDROCHLORIDE 25 MG/1
25 TABLET ORAL EVERY 6 HOURS PRN
COMMUNITY

## 2023-03-14 RX ORDER — OXYCODONE HYDROCHLORIDE AND ACETAMINOPHEN 5; 325 MG/1; MG/1
1 TABLET ORAL EVERY 6 HOURS PRN
COMMUNITY

## 2023-03-14 NOTE — PRE-PROCEDURE INSTRUCTIONS
PATIENT INSTRUCTED TO BE:    - NPO AFTER MIDNIGHT EXCEPT CAN HAVE CLEAR LIQUIDS 2 HOURS PRIOR TO SURGERY ARRIVAL TIME     - TO HOLD ALL VITAMINS, SUPPLEMENTS, NSAIDS FOR ONE WEEK PRIOR TO THEIR SURGICAL PROCEDURE    - INSTRUCTED PT TO USE SURGICAL SOAP 1 TIME THE NIGHT PRIOR TO SURGERY OR THE AM OF SURGERY.   USE SOAP FROM NECK TO TOES AVOID THEIR FACE, HAIR, AND PRIVATE PARTS. INSTRUCTED NO LOTIONS, JEWELRY, PIERCINGS, OR DEODORANT DAY OF SURGERY    - IF DIABETIC, CHECK BLOOD GLUCOSE IF LESS THAN 70 CALL PREOP AREA -AM OF SURGERY     - INSTRUCTED TO TAKE THE FOLLOWING MEDICATIONS THE DAY OF SURGERY:     ABILIFY, ZYRTEC, LEXAPRO, ZANAFLEX     PATIENT VERBALIZED UNDERSTANDING

## 2023-03-15 ENCOUNTER — ANESTHESIA (OUTPATIENT)
Dept: PERIOP | Facility: HOSPITAL | Age: 53
End: 2023-03-15
Payer: COMMERCIAL

## 2023-03-15 ENCOUNTER — TELEPHONE (OUTPATIENT)
Dept: PLASTIC SURGERY | Facility: CLINIC | Age: 53
End: 2023-03-15
Payer: COMMERCIAL

## 2023-03-15 ENCOUNTER — HOSPITAL ENCOUNTER (OUTPATIENT)
Facility: HOSPITAL | Age: 53
Setting detail: HOSPITAL OUTPATIENT SURGERY
Discharge: HOME | End: 2023-03-15
Attending: SURGERY | Admitting: SURGERY
Payer: COMMERCIAL

## 2023-03-15 ENCOUNTER — ANESTHESIA EVENT (OUTPATIENT)
Dept: PERIOP | Facility: HOSPITAL | Age: 53
End: 2023-03-15
Payer: COMMERCIAL

## 2023-03-15 VITALS
BODY MASS INDEX: 40.33 KG/M2 | RESPIRATION RATE: 18 BRPM | HEART RATE: 68 BPM | DIASTOLIC BLOOD PRESSURE: 89 MMHG | OXYGEN SATURATION: 98 % | SYSTOLIC BLOOD PRESSURE: 137 MMHG | WEIGHT: 219.14 LBS | TEMPERATURE: 98.6 F | HEIGHT: 62 IN

## 2023-03-15 DIAGNOSIS — N62 MACROMASTIA: ICD-10-CM

## 2023-03-15 LAB — COTININE UR-MCNC: POSITIVE NG/ML

## 2023-03-15 PROCEDURE — G0480 DRUG TEST DEF 1-7 CLASSES: HCPCS | Performed by: SURGERY

## 2023-03-15 PROCEDURE — G0463 HOSPITAL OUTPT CLINIC VISIT: HCPCS | Performed by: SURGERY

## 2023-03-15 PROCEDURE — 93005 ELECTROCARDIOGRAM TRACING: CPT | Performed by: ANESTHESIOLOGY

## 2023-03-15 RX ORDER — SODIUM CHLORIDE, SODIUM LACTATE, POTASSIUM CHLORIDE, CALCIUM CHLORIDE 600; 310; 30; 20 MG/100ML; MG/100ML; MG/100ML; MG/100ML
9 INJECTION, SOLUTION INTRAVENOUS CONTINUOUS PRN
Status: DISCONTINUED | OUTPATIENT
Start: 2023-03-15 | End: 2023-03-15 | Stop reason: HOSPADM

## 2023-03-15 RX ORDER — ETANERCEPT 50 MG/ML
50 SOLUTION SUBCUTANEOUS WEEKLY
COMMUNITY

## 2023-03-15 RX ORDER — CEFAZOLIN SODIUM 2 G/100ML
2 INJECTION, SOLUTION INTRAVENOUS ONCE
Status: DISCONTINUED | OUTPATIENT
Start: 2023-03-15 | End: 2023-03-15 | Stop reason: HOSPADM

## 2023-03-15 RX ORDER — MIDAZOLAM HYDROCHLORIDE 2 MG/2ML
2 INJECTION, SOLUTION INTRAMUSCULAR; INTRAVENOUS ONCE
Status: DISCONTINUED | OUTPATIENT
Start: 2023-03-15 | End: 2023-03-15 | Stop reason: HOSPADM

## 2023-03-15 RX ORDER — ACETAMINOPHEN 500 MG
1000 TABLET ORAL ONCE
Status: DISCONTINUED | OUTPATIENT
Start: 2023-03-15 | End: 2023-03-15 | Stop reason: HOSPADM

## 2023-03-15 NOTE — ANESTHESIA PREPROCEDURE EVALUATION
Anesthesia Evaluation     Patient summary reviewed and Nursing notes reviewed                Airway   Mallampati: I  TM distance: >3 FB  Neck ROM: full  No difficulty expected  Dental      Pulmonary - negative pulmonary ROS and normal exam    breath sounds clear to auscultation  Cardiovascular - normal exam    Rhythm: regular  Rate: normal    (+) valvular problems/murmurs,       Neuro/Psych  (+) psychiatric history Bipolar and Anxiety,    GI/Hepatic/Renal/Endo    (+) morbid obesity,      Musculoskeletal     Abdominal    Substance History - negative use     OB/GYN negative ob/gyn ROS         Other   arthritis,                      Anesthesia Plan    ASA 3     general     intravenous induction     Anesthetic plan, risks, benefits, and alternatives have been provided, discussed and informed consent has been obtained with: patient.        CODE STATUS:

## 2023-03-15 NOTE — TELEPHONE ENCOUNTER
Called patient first attempt to schedule a re-eval for surgery plan. Patient did not answer and voicemail is not set up.

## 2023-03-16 LAB — QT INTERVAL: 402 MS

## 2023-03-27 NOTE — PROGRESS NOTES
"Follow-up (Discuss surgery plan)            History of Present Illness  Judith Azevedo is a 52 y.o. female who presents to [facility] for Pre-Operative Examination for bilateral breast reduction scheduled for 3/15/23.      Pt was nicotine positive day of surgery. Pt is here today to discuss plan.    Pt states she was a t a family reunion before her surgery date and believes 2nd hand smoke made her test positive day of surgery.  Patient has 2 prior negative urine nicotine tests.  Patient states she has been compliant with smoking cessation.      Subjective        Patient has no known allergies.  Allergies Reconciled.    Review of Systems   All review of system has been reviewed and it  is negative except the ones note above.     Objective     /88 (BP Location: Left arm, Patient Position: Sitting, Cuff Size: Adult)   Pulse 71   Temp 98.4 °F (36.9 °C) (Temporal)   Ht 157.5 cm (62.01\")   Wt 99.8 kg (220 lb)   SpO2 97%   BMI 40.23 kg/m²     Body mass index is 40.23 kg/m².    Physical Exam   Cardiovascular: Normal rate.     Pulmonary/Chest  Effort normal.   Breast  No interval change on breast examination    Result Review :                Assessment and Plan      Diagnoses and all orders for this visit:    1. Pre-op evaluation (Primary)  -     Nicotine Screen, Urine - Urine, Clean Catch; Future    2. Macromastia    Symptomatic macromastia    Plan:  Bilateral breast reduction  • Removal of more than 700 g from the right side  • Removal more than 600 g from the left side      Discussed with pt if Nicotine urine test comes back negative today will reschedule surgery.  Pt is aware will be retested again day of surgery.  Discussed the negative impact nicotine causes.  Reiterated the reason that the nicotine is still harmful to wound healing.  That the nicotine causes vasoconstriction and consequently there is inadequate blood flow to the wound edges immediately " postoperative.  This causes fat and skin necrosis and significantly increase the risk for wound complications.        Follow Up     Post op 3/23/23    Return for pre op once surgery is scheduled.    Patient was given instructions and counseling regarding her condition. Please see specific information pulled into the AVS if appropriate.     David Paez MD  04/06/2023

## 2023-04-06 ENCOUNTER — LAB (OUTPATIENT)
Dept: LAB | Facility: HOSPITAL | Age: 53
End: 2023-04-06
Payer: COMMERCIAL

## 2023-04-06 ENCOUNTER — OFFICE VISIT (OUTPATIENT)
Dept: PLASTIC SURGERY | Facility: CLINIC | Age: 53
End: 2023-04-06
Payer: COMMERCIAL

## 2023-04-06 VITALS
DIASTOLIC BLOOD PRESSURE: 88 MMHG | HEART RATE: 71 BPM | SYSTOLIC BLOOD PRESSURE: 144 MMHG | OXYGEN SATURATION: 97 % | WEIGHT: 220 LBS | BODY MASS INDEX: 40.48 KG/M2 | TEMPERATURE: 98.4 F | HEIGHT: 62 IN

## 2023-04-06 DIAGNOSIS — Z01.818 PRE-OP EVALUATION: ICD-10-CM

## 2023-04-06 DIAGNOSIS — N62 MACROMASTIA: ICD-10-CM

## 2023-04-06 DIAGNOSIS — Z01.818 PRE-OP EVALUATION: Primary | ICD-10-CM

## 2023-04-06 LAB — COTININE UR-MCNC: NEGATIVE NG/ML

## 2023-04-06 PROCEDURE — 1160F RVW MEDS BY RX/DR IN RCRD: CPT | Performed by: SURGERY

## 2023-04-06 PROCEDURE — 99213 OFFICE O/P EST LOW 20 MIN: CPT | Performed by: SURGERY

## 2023-04-06 PROCEDURE — G0480 DRUG TEST DEF 1-7 CLASSES: HCPCS

## 2023-04-06 PROCEDURE — 1159F MED LIST DOCD IN RCRD: CPT | Performed by: SURGERY

## 2023-04-20 ENCOUNTER — PREP FOR SURGERY (OUTPATIENT)
Dept: OTHER | Facility: HOSPITAL | Age: 53
End: 2023-04-20
Payer: COMMERCIAL

## 2023-04-20 DIAGNOSIS — N62 MACROMASTIA: Primary | ICD-10-CM

## 2023-04-20 RX ORDER — CEFAZOLIN SODIUM 2 G/100ML
2 INJECTION, SOLUTION INTRAVENOUS ONCE
OUTPATIENT
Start: 2023-04-20 | End: 2023-04-20

## 2023-05-16 NOTE — PROGRESS NOTES
"Pre-op Exam (Pre op for BBR 06/09/2023)            History of Present Illness  Judith Azevedo is a 53 y.o. female who presents to [facility] for Pre-Operative Examination for Bilateral Breast Reduction 6/9/23..  Subjective    Pt presents today for pre op for panniculectomy 6/9/23.      Patient has no known allergies.  Allergies Reconciled.    Review of Systems   All review of system has been reviewed and it  is negative except the ones note above.     Objective     /82 (BP Location: Left arm)   Pulse 72   Temp 98.3 °F (36.8 °C) (Temporal)   Ht 157.5 cm (62\")   Wt 99.7 kg (219 lb 12.8 oz)   SpO2 98%   BMI 40.20 kg/m²     Body mass index is 40.2 kg/m².    Physical Exam   Cardiovascular: Normal rate.     Pulmonary/Chest  Effort normal.      Breast  No interval change in breast examination    Result Review :                Assessment and Plan      Diagnoses and all orders for this visit:    1. Pre-operative examination (Primary)  -     Nicotine Screen, Urine - Urine, Clean Catch; Future    2. Macromastia        Plan:  Bilateral breast reduction  • Removal of more than 700 g from the right side  • Removal more than 600 g from the left side     The risks and benefits of the procedure were again discussed with the patient.  The risk described included, but not limited to, bleeding, infection, need for revision surgeries, seroma, hematoma, poor cosmetic result, poor functional result, edge necrosis, edge separation, deep venous thrombosis, pulmonary embolus, pneumonia, heart attack, stroke, death.  Her questions were answered. She still agrees to the procedure.        • Given these options, the patient has verbally expressed an understanding of the risks of surgery and finds these risks acceptable. We will proceed with surgery as soon as possible.    • Medications sent to pharmacy-sent previously not today     Cotinine Ordered; pt aware      Scribed by Leydi Tay, " acting as a scribe for David Paez MD, 05/25/23 11:02 EDT.  David Paez MD's signature on the note affirms that the note adequately documents the care provided.    CPT code  Breast reduction, 88408-43  82176 use of spy        Follow Up     Return one week post op.    Patient was given instructions and counseling regarding her condition. Please see specific information pulled into the AVS if appropriate.     David Paez MD  05/25/2023

## 2023-05-16 NOTE — H&P (VIEW-ONLY)
"Pre-op Exam (Pre op for BBR 06/09/2023)            History of Present Illness  Judith Azevedo is a 53 y.o. female who presents to [facility] SURGERY for Pre-Operative Examination for Bilateral Breast Reduction 6/9/23..  Subjective    Pt presents today for pre op for panniculectomy 6/9/23.      Patient has no known allergies.  Allergies Reconciled.    Review of Systems   All review of system has been reviewed and it  is negative except the ones note above.     Objective     /82 (BP Location: Left arm)   Pulse 72   Temp 98.3 °F (36.8 °C) (Temporal)   Ht 157.5 cm (62\")   Wt 99.7 kg (219 lb 12.8 oz)   SpO2 98%   BMI 40.20 kg/m²     Body mass index is 40.2 kg/m².    Physical Exam   Cardiovascular: Normal rate.     Pulmonary/Chest  Effort normal.      Breast  No interval change in breast examination    Result Review :                Assessment and Plan      Diagnoses and all orders for this visit:    1. Pre-operative examination (Primary)  -     Nicotine Screen, Urine - Urine, Clean Catch; Future    2. Macromastia        Plan:  Bilateral breast reduction  • Removal of more than 700 g from the right side  • Removal more than 600 g from the left side     The risks and benefits of the procedure were again discussed with the patient.  The risk described included, but not limited to, bleeding, infection, need for revision surgeries, seroma, hematoma, poor cosmetic result, poor functional result, edge necrosis, edge separation, deep venous thrombosis, pulmonary embolus, pneumonia, heart attack, stroke, death.  Her questions were answered. She still agrees to the procedure.        • Given these options, the patient has verbally expressed an understanding of the risks of surgery and finds these risks acceptable. We will proceed with surgery as soon as possible.    • Medications sent to pharmacy-sent previously not today     Cotinine Ordered; pt aware      Scribed by Leydi Tay, " acting as a scribe for David Paez MD, 05/25/23 11:02 EDT.  David Paez MD's signature on the note affirms that the note adequately documents the care provided.    CPT code  Breast reduction, 30318-54  22471 use of spy        Follow Up     Return one week post op.    Patient was given instructions and counseling regarding her condition. Please see specific information pulled into the AVS if appropriate.     David Paez MD  05/25/2023

## 2023-05-25 ENCOUNTER — OFFICE VISIT (OUTPATIENT)
Dept: PLASTIC SURGERY | Facility: CLINIC | Age: 53
End: 2023-05-25
Payer: COMMERCIAL

## 2023-05-25 ENCOUNTER — HOSPITAL ENCOUNTER (OUTPATIENT)
Dept: GENERAL RADIOLOGY | Facility: HOSPITAL | Age: 53
Discharge: HOME | End: 2023-05-25
Payer: COMMERCIAL

## 2023-05-25 ENCOUNTER — TRANSCRIBE ORDERS (OUTPATIENT)
Dept: ADMINISTRATIVE | Facility: HOSPITAL | Age: 53
End: 2023-05-25
Payer: COMMERCIAL

## 2023-05-25 ENCOUNTER — LAB (OUTPATIENT)
Dept: LAB | Facility: HOSPITAL | Age: 53
End: 2023-05-25
Payer: COMMERCIAL

## 2023-05-25 ENCOUNTER — TRANSCRIBE ORDERS (OUTPATIENT)
Dept: ADMINISTRATIVE | Facility: HOSPITAL | Age: 53
End: 2023-05-25

## 2023-05-25 VITALS
WEIGHT: 219.8 LBS | BODY MASS INDEX: 40.45 KG/M2 | DIASTOLIC BLOOD PRESSURE: 82 MMHG | TEMPERATURE: 98.3 F | OXYGEN SATURATION: 98 % | HEIGHT: 62 IN | SYSTOLIC BLOOD PRESSURE: 149 MMHG | HEART RATE: 72 BPM

## 2023-05-25 DIAGNOSIS — Z01.818 PRE-OPERATIVE EXAMINATION: Primary | ICD-10-CM

## 2023-05-25 DIAGNOSIS — E55.9 VITAMIN D DEFICIENCY: ICD-10-CM

## 2023-05-25 DIAGNOSIS — Z01.818 PRE-OPERATIVE EXAMINATION: ICD-10-CM

## 2023-05-25 DIAGNOSIS — M06.9 RHEUMATOID ARTHRITIS, INVOLVING UNSPECIFIED SITE, UNSPECIFIED WHETHER RHEUMATOID FACTOR PRESENT: ICD-10-CM

## 2023-05-25 DIAGNOSIS — Z11.1 SCREENING-PULMONARY TB: ICD-10-CM

## 2023-05-25 DIAGNOSIS — M05.79 SEROPOSITIVE RHEUMATOID ARTHRITIS OF MULTIPLE SITES: Primary | ICD-10-CM

## 2023-05-25 DIAGNOSIS — M25.50 PAIN IN JOINT, MULTIPLE SITES: ICD-10-CM

## 2023-05-25 DIAGNOSIS — N62 MACROMASTIA: ICD-10-CM

## 2023-05-25 DIAGNOSIS — Z79.899 ENCOUNTER FOR LONG-TERM (CURRENT) USE OF OTHER MEDICATIONS: ICD-10-CM

## 2023-05-25 DIAGNOSIS — M05.79 SEROPOSITIVE RHEUMATOID ARTHRITIS OF MULTIPLE SITES: ICD-10-CM

## 2023-05-25 LAB
ALBUMIN SERPL-MCNC: 4.3 G/DL (ref 3.5–5.2)
ALBUMIN/GLOB SERPL: 1.2 G/DL
ALP SERPL-CCNC: 141 U/L (ref 39–117)
ALT SERPL W P-5'-P-CCNC: 7 U/L (ref 1–33)
ANION GAP SERPL CALCULATED.3IONS-SCNC: 12.8 MMOL/L (ref 5–15)
AST SERPL-CCNC: 13 U/L (ref 1–32)
BASOPHILS # BLD AUTO: 0.04 10*3/MM3 (ref 0–0.2)
BASOPHILS NFR BLD AUTO: 0.4 % (ref 0–1.5)
BILIRUB SERPL-MCNC: 0.3 MG/DL (ref 0–1.2)
BUN SERPL-MCNC: 13 MG/DL (ref 6–20)
BUN/CREAT SERPL: 23.2 (ref 7–25)
CALCIUM SPEC-SCNC: 10.1 MG/DL (ref 8.6–10.5)
CHLORIDE SERPL-SCNC: 103 MMOL/L (ref 98–107)
CO2 SERPL-SCNC: 24.2 MMOL/L (ref 22–29)
COTININE UR-MCNC: NEGATIVE NG/ML
CREAT SERPL-MCNC: 0.56 MG/DL (ref 0.57–1)
CRP SERPL-MCNC: 12 MG/DL (ref 0–0.5)
DEPRECATED RDW RBC AUTO: 41.4 FL (ref 37–54)
EGFRCR SERPLBLD CKD-EPI 2021: 110.7 ML/MIN/1.73
EOSINOPHIL # BLD AUTO: 0.29 10*3/MM3 (ref 0–0.4)
EOSINOPHIL NFR BLD AUTO: 2.7 % (ref 0.3–6.2)
ERYTHROCYTE [DISTWIDTH] IN BLOOD BY AUTOMATED COUNT: 13.2 % (ref 12.3–15.4)
GLOBULIN UR ELPH-MCNC: 3.5 GM/DL
GLUCOSE SERPL-MCNC: 90 MG/DL (ref 65–99)
HCT VFR BLD AUTO: 39.8 % (ref 34–46.6)
HGB BLD-MCNC: 12.9 G/DL (ref 12–15.9)
IMM GRANULOCYTES # BLD AUTO: 0.04 10*3/MM3 (ref 0–0.05)
IMM GRANULOCYTES NFR BLD AUTO: 0.4 % (ref 0–0.5)
LYMPHOCYTES # BLD AUTO: 2.31 10*3/MM3 (ref 0.7–3.1)
LYMPHOCYTES NFR BLD AUTO: 21.3 % (ref 19.6–45.3)
MCH RBC QN AUTO: 28.1 PG (ref 26.6–33)
MCHC RBC AUTO-ENTMCNC: 32.4 G/DL (ref 31.5–35.7)
MCV RBC AUTO: 86.7 FL (ref 79–97)
MONOCYTES # BLD AUTO: 0.38 10*3/MM3 (ref 0.1–0.9)
MONOCYTES NFR BLD AUTO: 3.5 % (ref 5–12)
NEUTROPHILS NFR BLD AUTO: 7.8 10*3/MM3 (ref 1.7–7)
NEUTROPHILS NFR BLD AUTO: 71.7 % (ref 42.7–76)
NRBC BLD AUTO-RTO: 0 /100 WBC (ref 0–0.2)
PLATELET # BLD AUTO: 448 10*3/MM3 (ref 140–450)
PMV BLD AUTO: 9.8 FL (ref 6–12)
POTASSIUM SERPL-SCNC: 4.5 MMOL/L (ref 3.5–5.2)
PROT SERPL-MCNC: 7.8 G/DL (ref 6–8.5)
RBC # BLD AUTO: 4.59 10*6/MM3 (ref 3.77–5.28)
SODIUM SERPL-SCNC: 140 MMOL/L (ref 136–145)
WBC NRBC COR # BLD: 10.86 10*3/MM3 (ref 3.4–10.8)

## 2023-05-25 PROCEDURE — 36415 COLL VENOUS BLD VENIPUNCTURE: CPT

## 2023-05-25 PROCEDURE — G0480 DRUG TEST DEF 1-7 CLASSES: HCPCS

## 2023-05-25 PROCEDURE — 71046 X-RAY EXAM CHEST 2 VIEWS: CPT

## 2023-05-25 PROCEDURE — 86140 C-REACTIVE PROTEIN: CPT

## 2023-05-25 PROCEDURE — 85025 COMPLETE CBC W/AUTO DIFF WBC: CPT

## 2023-05-25 PROCEDURE — 80053 COMPREHEN METABOLIC PANEL: CPT

## 2023-05-25 RX ORDER — ARIPIPRAZOLE 10 MG/1
TABLET ORAL
COMMUNITY
Start: 2023-05-19

## 2023-06-06 NOTE — PROGRESS NOTES
"Chief Complaint  Post-op Follow-up (BBR on 6/9/2023)    Subjective          History of Present Illness  Judith Azevedo is a 53 y.o. female who presents to [facility] SURGERY for Postoperative Follow-Up of Bilateral Breast Reduction 6/9/23.    Pt presents today for 1 week post op, doing ok.  Has noted some yellow on bandages but not sure if from iodine or from incisions.    Pt is very happy with results.  Has improved back, neck and shoulder pain.    Allergies: Patient has no known allergies.  Allergies Reconciled.    Review of Systems   All review of system has been reviewed and it  is negative except the ones note above.     Objective     /84 (BP Location: Left arm, Patient Position: Sitting, Cuff Size: Large Adult)   Pulse 79   Temp 98 °F (36.7 °C) (Temporal)   Ht 157.5 cm (62.01\")   Wt 99.9 kg (220 lb 3.2 oz)   SpO2 96%   BMI 40.26 kg/m²     Body mass index is 40.26 kg/m².    Physical Exam   Cardiovascular: Normal rate.     Pulmonary/Chest  Effort normal.     Breast  Patient with some bruising and resolving bruising on both breast, most prominent on the lower pole.  No erythema or induration.  No fluctuance.  No drainage.  At the T portion of the right breast the patient has some mottled skin.  Less than 1 cm.  Result Review :                Assessment and Plan      Diagnoses and all orders for this visit:    1. Postoperative follow-up (Primary)        Plan:  Doing well with no evidence of fluid collection or infection  Patient was reminded that the mottled skin at the T portion of the right breast will convert to a full-thickness skin defect and result in a shallow wound will heal by secondary intention.  The patient will be able to maintain her normal level of activity while this is healing.  Water present throughout  Discussed swelling can last up to 3 months          Follow Up     Return in about 2 weeks (around 6/29/2023).    Patient was given instructions " and counseling regarding her condition. Please see specific information pulled into the AVS if appropriate.     David Paez MD  06/15/2023

## 2023-06-08 RX ORDER — CEPHALEXIN 500 MG/1
500 CAPSULE ORAL 4 TIMES DAILY
COMMUNITY
End: 2023-06-15

## 2023-06-08 NOTE — PRE-PROCEDURE INSTRUCTIONS
PATIENT INSTRUCTED TO BE:    - NPO AFTER MIDNIGHT EXCEPT CAN HAVE CLEAR LIQUIDS 2 HOURS PRIOR TO SURGERY ARRIVAL TIME     - TO HOLD ALL VITAMINS, SUPPLEMENTS, NSAIDS FOR ONE WEEK PRIOR TO THEIR SURGICAL PROCEDURE    - INSTRUCTED PT TO USE SURGICAL SOAP 1 TIME THE NIGHT PRIOR TO SURGERY OR THE AM OF SURGERY.   USE SOAP FROM NECK TO TOES AVOID THEIR FACE, HAIR, AND PRIVATE PARTS. INSTRUCTED NO LOTIONS, JEWELRY, PIERCINGS, OR DEODORANT DAY OF SURGERY    - IF DIABETIC, CHECK BLOOD GLUCOSE IF LESS THAN 70 CALL PREOP AREA -AM OF SURGERY     - INSTRUCTED TO TAKE THE FOLLOWING MEDICATIONS THE DAY OF SURGERY:          LEXAPRO, ZYRTEC, ZANAFLEX PRN     PATIENT VERBALIZED UNDERSTANDING

## 2023-06-09 ENCOUNTER — HOSPITAL ENCOUNTER (OUTPATIENT)
Facility: HOSPITAL | Age: 53
Setting detail: HOSPITAL OUTPATIENT SURGERY
Discharge: HOME | End: 2023-06-09
Attending: SURGERY | Admitting: SURGERY
Payer: COMMERCIAL

## 2023-06-09 ENCOUNTER — ANESTHESIA EVENT (OUTPATIENT)
Dept: PERIOP | Facility: HOSPITAL | Age: 53
End: 2023-06-09
Payer: COMMERCIAL

## 2023-06-09 ENCOUNTER — ANESTHESIA (OUTPATIENT)
Dept: PERIOP | Facility: HOSPITAL | Age: 53
End: 2023-06-09
Payer: COMMERCIAL

## 2023-06-09 VITALS
RESPIRATION RATE: 17 BRPM | BODY MASS INDEX: 39.92 KG/M2 | TEMPERATURE: 97.5 F | WEIGHT: 216.93 LBS | DIASTOLIC BLOOD PRESSURE: 67 MMHG | HEIGHT: 62 IN | OXYGEN SATURATION: 95 % | SYSTOLIC BLOOD PRESSURE: 139 MMHG | HEART RATE: 75 BPM

## 2023-06-09 DIAGNOSIS — N62 MACROMASTIA: ICD-10-CM

## 2023-06-09 LAB — COTININE UR-MCNC: NEGATIVE NG/ML

## 2023-06-09 PROCEDURE — 25010000002 SUGAMMADEX 200 MG/2ML SOLUTION: Performed by: NURSE ANESTHETIST, CERTIFIED REGISTERED

## 2023-06-09 PROCEDURE — 25010000002 PROPOFOL 10 MG/ML EMULSION: Performed by: NURSE ANESTHETIST, CERTIFIED REGISTERED

## 2023-06-09 PROCEDURE — 25010000002 ONDANSETRON PER 1 MG: Performed by: NURSE ANESTHETIST, CERTIFIED REGISTERED

## 2023-06-09 PROCEDURE — 25010000002 FENTANYL CITRATE (PF) 50 MCG/ML SOLUTION: Performed by: NURSE ANESTHETIST, CERTIFIED REGISTERED

## 2023-06-09 PROCEDURE — 19318 BREAST REDUCTION: CPT

## 2023-06-09 PROCEDURE — G0480 DRUG TEST DEF 1-7 CLASSES: HCPCS | Performed by: SURGERY

## 2023-06-09 PROCEDURE — 25010000002 DEXAMETHASONE PER 1 MG: Performed by: NURSE ANESTHETIST, CERTIFIED REGISTERED

## 2023-06-09 PROCEDURE — 25010000002 CEFAZOLIN IN DEXTROSE 2-4 GM/100ML-% SOLUTION: Performed by: SURGERY

## 2023-06-09 PROCEDURE — 0 HYDROMORPHONE 1 MG/ML SOLUTION: Performed by: NURSE ANESTHETIST, CERTIFIED REGISTERED

## 2023-06-09 PROCEDURE — 88305 TISSUE EXAM BY PATHOLOGIST: CPT | Performed by: SURGERY

## 2023-06-09 PROCEDURE — 25010000002 HYDROMORPHONE 1 MG/ML SOLUTION: Performed by: NURSE ANESTHETIST, CERTIFIED REGISTERED

## 2023-06-09 PROCEDURE — 19318 BREAST REDUCTION: CPT | Performed by: SURGERY

## 2023-06-09 RX ORDER — ROCURONIUM BROMIDE 10 MG/ML
INJECTION, SOLUTION INTRAVENOUS AS NEEDED
Status: DISCONTINUED | OUTPATIENT
Start: 2023-06-09 | End: 2023-06-09 | Stop reason: SURG

## 2023-06-09 RX ORDER — SODIUM CHLORIDE, SODIUM LACTATE, POTASSIUM CHLORIDE, CALCIUM CHLORIDE 600; 310; 30; 20 MG/100ML; MG/100ML; MG/100ML; MG/100ML
9 INJECTION, SOLUTION INTRAVENOUS CONTINUOUS PRN
Status: DISCONTINUED | OUTPATIENT
Start: 2023-06-09 | End: 2023-06-09 | Stop reason: HOSPADM

## 2023-06-09 RX ORDER — ONDANSETRON 2 MG/ML
4 INJECTION INTRAMUSCULAR; INTRAVENOUS ONCE AS NEEDED
Status: DISCONTINUED | OUTPATIENT
Start: 2023-06-09 | End: 2023-06-09 | Stop reason: HOSPADM

## 2023-06-09 RX ORDER — CEFAZOLIN SODIUM 2 G/100ML
2 INJECTION, SOLUTION INTRAVENOUS ONCE
Status: COMPLETED | OUTPATIENT
Start: 2023-06-09 | End: 2023-06-09

## 2023-06-09 RX ORDER — MIDAZOLAM HYDROCHLORIDE 2 MG/2ML
2 INJECTION, SOLUTION INTRAMUSCULAR; INTRAVENOUS ONCE
Status: DISCONTINUED | OUTPATIENT
Start: 2023-06-09 | End: 2023-06-09 | Stop reason: HOSPADM

## 2023-06-09 RX ORDER — DEXAMETHASONE SODIUM PHOSPHATE 4 MG/ML
INJECTION, SOLUTION INTRA-ARTICULAR; INTRALESIONAL; INTRAMUSCULAR; INTRAVENOUS; SOFT TISSUE AS NEEDED
Status: DISCONTINUED | OUTPATIENT
Start: 2023-06-09 | End: 2023-06-09 | Stop reason: SURG

## 2023-06-09 RX ORDER — PROMETHAZINE HYDROCHLORIDE 25 MG/1
25 SUPPOSITORY RECTAL ONCE AS NEEDED
Status: DISCONTINUED | OUTPATIENT
Start: 2023-06-09 | End: 2023-06-09 | Stop reason: HOSPADM

## 2023-06-09 RX ORDER — DEXMEDETOMIDINE HYDROCHLORIDE 100 UG/ML
INJECTION, SOLUTION INTRAVENOUS AS NEEDED
Status: DISCONTINUED | OUTPATIENT
Start: 2023-06-09 | End: 2023-06-09 | Stop reason: SURG

## 2023-06-09 RX ORDER — ACETAMINOPHEN 500 MG
1000 TABLET ORAL ONCE
Status: DISCONTINUED | OUTPATIENT
Start: 2023-06-09 | End: 2023-06-09 | Stop reason: HOSPADM

## 2023-06-09 RX ORDER — MEPERIDINE HYDROCHLORIDE 25 MG/ML
12.5 INJECTION INTRAMUSCULAR; INTRAVENOUS; SUBCUTANEOUS
Status: DISCONTINUED | OUTPATIENT
Start: 2023-06-09 | End: 2023-06-09 | Stop reason: HOSPADM

## 2023-06-09 RX ORDER — PROMETHAZINE HYDROCHLORIDE 12.5 MG/1
25 TABLET ORAL ONCE AS NEEDED
Status: DISCONTINUED | OUTPATIENT
Start: 2023-06-09 | End: 2023-06-09 | Stop reason: HOSPADM

## 2023-06-09 RX ORDER — LIDOCAINE HYDROCHLORIDE 20 MG/ML
INJECTION, SOLUTION EPIDURAL; INFILTRATION; INTRACAUDAL; PERINEURAL AS NEEDED
Status: DISCONTINUED | OUTPATIENT
Start: 2023-06-09 | End: 2023-06-09 | Stop reason: SURG

## 2023-06-09 RX ORDER — ONDANSETRON 2 MG/ML
INJECTION INTRAMUSCULAR; INTRAVENOUS AS NEEDED
Status: DISCONTINUED | OUTPATIENT
Start: 2023-06-09 | End: 2023-06-09 | Stop reason: SURG

## 2023-06-09 RX ORDER — OXYCODONE HYDROCHLORIDE 5 MG/1
5 TABLET ORAL
Status: COMPLETED | OUTPATIENT
Start: 2023-06-09 | End: 2023-06-09

## 2023-06-09 RX ORDER — PROPOFOL 10 MG/ML
VIAL (ML) INTRAVENOUS AS NEEDED
Status: DISCONTINUED | OUTPATIENT
Start: 2023-06-09 | End: 2023-06-09 | Stop reason: SURG

## 2023-06-09 RX ORDER — FENTANYL CITRATE 50 UG/ML
INJECTION, SOLUTION INTRAMUSCULAR; INTRAVENOUS AS NEEDED
Status: DISCONTINUED | OUTPATIENT
Start: 2023-06-09 | End: 2023-06-09 | Stop reason: SURG

## 2023-06-09 RX ADMIN — DEXMEDETOMIDINE HYDROCHLORIDE 20 MCG: 100 INJECTION, SOLUTION, CONCENTRATE INTRAVENOUS at 11:09

## 2023-06-09 RX ADMIN — DEXMEDETOMIDINE HYDROCHLORIDE 20 MCG: 100 INJECTION, SOLUTION, CONCENTRATE INTRAVENOUS at 11:47

## 2023-06-09 RX ADMIN — ONDANSETRON 4 MG: 2 INJECTION INTRAMUSCULAR; INTRAVENOUS at 14:03

## 2023-06-09 RX ADMIN — FENTANYL CITRATE 50 MCG: 50 INJECTION, SOLUTION INTRAMUSCULAR; INTRAVENOUS at 11:24

## 2023-06-09 RX ADMIN — ONDANSETRON 4 MG: 2 INJECTION INTRAMUSCULAR; INTRAVENOUS at 11:27

## 2023-06-09 RX ADMIN — FENTANYL CITRATE 50 MCG: 50 INJECTION, SOLUTION INTRAMUSCULAR; INTRAVENOUS at 11:09

## 2023-06-09 RX ADMIN — LIDOCAINE HYDROCHLORIDE 50 MG: 20 INJECTION, SOLUTION EPIDURAL; INFILTRATION; INTRACAUDAL; PERINEURAL at 11:09

## 2023-06-09 RX ADMIN — ROCURONIUM BROMIDE 50 MG: 10 INJECTION, SOLUTION INTRAVENOUS at 11:09

## 2023-06-09 RX ADMIN — HYDROMORPHONE HYDROCHLORIDE 0.5 MG: 1 INJECTION, SOLUTION INTRAMUSCULAR; INTRAVENOUS; SUBCUTANEOUS at 13:41

## 2023-06-09 RX ADMIN — DEXAMETHASONE SODIUM PHOSPHATE 4 MG: 4 INJECTION, SOLUTION INTRA-ARTICULAR; INTRALESIONAL; INTRAMUSCULAR; INTRAVENOUS; SOFT TISSUE at 11:27

## 2023-06-09 RX ADMIN — HYDROMORPHONE HYDROCHLORIDE 0.5 MG: 1 INJECTION, SOLUTION INTRAMUSCULAR; INTRAVENOUS; SUBCUTANEOUS at 13:32

## 2023-06-09 RX ADMIN — SUGAMMADEX 200 MG: 100 INJECTION, SOLUTION INTRAVENOUS at 13:12

## 2023-06-09 RX ADMIN — HYDROMORPHONE HYDROCHLORIDE 0.5 MG: 1 INJECTION, SOLUTION INTRAMUSCULAR; INTRAVENOUS; SUBCUTANEOUS at 11:49

## 2023-06-09 RX ADMIN — HYDROMORPHONE HYDROCHLORIDE 0.5 MG: 1 INJECTION, SOLUTION INTRAMUSCULAR; INTRAVENOUS; SUBCUTANEOUS at 12:02

## 2023-06-09 RX ADMIN — PROPOFOL 200 MG: 10 INJECTION, EMULSION INTRAVENOUS at 11:09

## 2023-06-09 RX ADMIN — CEFAZOLIN SODIUM 2 G: 2 INJECTION, SOLUTION INTRAVENOUS at 11:04

## 2023-06-09 RX ADMIN — OXYCODONE HYDROCHLORIDE 5 MG: 5 TABLET ORAL at 14:03

## 2023-06-09 RX ADMIN — SODIUM CHLORIDE, POTASSIUM CHLORIDE, SODIUM LACTATE AND CALCIUM CHLORIDE: 600; 310; 30; 20 INJECTION, SOLUTION INTRAVENOUS at 11:04

## 2023-06-09 RX ADMIN — OXYCODONE HYDROCHLORIDE 5 MG: 5 TABLET ORAL at 14:26

## 2023-06-09 NOTE — DISCHARGE INSTRUCTIONS
DISCHARGE INSTRUCTIONS  SURGICAL / AMBULATORY  PROCEDURES      For your surgery you had:  General anesthesia (you may have a sore throat for the first 24 hours)  IV sedation.  Local anesthesia  Monitored anesthesia Care  You received a medicated patch for nausea prevention today (behind your ear). It is recommended that you remove it 24-48 hours post-operatively. It must be removed within 72 hours.   You have received an anesthesia medication today that can cause hormonal forms of birth control to be ineffective. You should use a different form of birth control (to prevent pregnancy) for 7 days.   You may experience dizziness, drowsiness, or light-headedness for several hours following surgery/procedure.  Do not stay alone today or tonight.  Limit your activity for 24 hours.  Resume your diet slowly.  Follow whatever special dietary instructions you may have been given by your doctor.  You should not drive or operate machinery, drink alcohol, or sign legally binding documents for 24 hours or while you are taking pain medication.  Last dose of pain medication was given at:   .  NOTIFY YOUR DOCTOR IF YOU EXPERIENCE ANY OF THE FOLLOWING:  Temperature greater than 101 degrees Fahrenheit  Shaking Chills  Redness or excessive drainage from incision  Nausea, vomiting and/or pain that is not controlled by prescribed medications  Increase in bleeding or bleeding that is excessive  Unable to urinate in 6 hours after surgery  If unable to reach your doctor, please go to the closest Emergency Room  You may begin dressing changes:     [x] in 48 hours  You may remove Band-Aid/dressing tomorrow.  You may shower Sunday 6/11/23  Apply an ice pack 24-48 hours.  Medications per physician instructions as indicated on Discharge Medication Information Sheet.  You should see Dr. Paez for follow-up care   on June 15th at 11:15AM       SPECIAL INSTRUCTIONS:  Remove dressings in 2 days, when you remove the drains.  Patient may shower  "at that time.  Do not soak the incisions, but they can get wet.  Wash with soap and water, pat the incisions dry.  Apply petroleum jelly or Vaseline or Neosporin to the incisions and cover with absorbent dressing.  Maxi pads and pantiliners make good dressings. Continue to wear the sports bra.       Strip and empty drains at least 3 times a day.  Do not need to record drain output.  Remove drain in 2 days.   In addition to the excellent nurse education regarding drain care, you can also search on YouTube for the terms \"[facility]ANE drain care\" and \"[place] drain care\"      Wash the drain site with water and liquid soap every day. No sponge or cloths. Wash the drain site before other parts of the body.  Strip drains every 8 hours. Wash hands or wear gloves when manipulating drains.  "

## 2023-06-09 NOTE — INTERVAL H&P NOTE
H&P reviewed. The patient was examined and there are no changes to the H&P.      The patient was marked in the standing position.  Her boyfriend was present throughout.  She was reminded that she cannot be guaranteed a specific cup size.  She has a inferolateral slant to her inframammary fold.  This will be more noticeable after the reduction.  This is secondary to her anatomy, not a consequence of the reduction.  She has a prominent axillary roll.  This will not be reduced as part of the reduction, it is not breast tissue.  She is aware of this.  The risks and benefits of the procedure were discussed with the patient.  The risk described included, but not limited to, bleeding, infection, need for revision surgeries, seroma, hematoma, poor cosmetic result, poor functional result, edge necrosis, edge separation, deep venous thrombosis, pulmonary embolus, pneumonia, heart attack, stroke, death.  Her questions were answered. She still agrees to the procedure.

## 2023-06-09 NOTE — OP NOTE
Plastic Surgery Op Note    BREAST REDUCTION BILATERAL    Judith Azevedo  6/9/2023    Pre-op Diagnosis:   Macromastia [N62]    Post-Op Diagnosis Codes:     * Macromastia [N62]    Anesthesia: General    Staff:   SURGEON: Jeannine  Circulator: Sharon Rosenberg RN  Scrub Person: ZepedaTracie  Assistant: Gaye Anderson RN CSA  Other: Carlos Azevedo RN    Estimated Blood Loss:  50 mL    Specimens:   Order Name Source Comment Collection Info Order Time   NICOTINE SCREEN, URINE Urine, Clean Catch  Collected By: Kayla Harmon RN 6/9/2023  9:16 AM     Release to patient   Routine Release        TISSUE PATHOLOGY EXAM Breast, Right  Collected By: David Paez MD 6/9/2023 11:47 AM     Release to patient   Routine Release              Drains:   Closed/Suction Drain 1 Inferior;Lateral;Right Breast Bulb 19 Fr. (Active)       Closed/Suction Drain 2 Inferior;Lateral;Left Breast Bulb 19 Fr. (Active)       Findings: Superior pedicle Wise pattern reduction  Removal of 1031 g in the left  Removal of 1247 g from the right    Complications: None    Indications for procedure: Symptomatic macromastia    Description of the procedure:      The patient presents for bilateral breast reduction for management of symptomatic macromastia.  The risks and benefits of the procedure were discussed with the patient.  Her questions were answered.  She agreed to the procedure.  The patient was marked in the standing position in the holding area.  The patient was then brought back to the operating room and placed in the supine position.  After general anesthesia was induced, her chest was sterilely prepped and draped in the standard surgical fashion.  Both breast were injected with a dilute Marcaine, lidocaine, epinephrine mix.  Beginning on the right side, the superior pedicle was de-epithelialized.  The medial and lateral incisions were then performed down towards the chest wall.  The inferior incision was then made and the  inferior wedge of breast tissue was brought up off the chest wall and then finally amputated off the inferior aspect of the superior pedicle.  Bleeding was controlled with the judicious use of electrocautery.  There was good punctate bleeding on the distal portion of the pedicle containing the nipple areolar complex.  Attention was then turned to the left side where similarly the superior pedicle was de-epithelialized and the medial and lateral incisions were made down to the chest wall.  The inferior incision was made and the inferior wedge of breast tissue was then brought up off the chest wall.  The wound was irrigated reexamined.  Bleeding was controlled electrocautery.  Both sides were then compared and there was felt to be a reasonable volume match between the 2 sides.  Both breast were closed in the same fashion.  The nipple areolar complex was brought up into the keyhole and secured with interrupted Vicryl's at the level of the dermis.  The skin flaps were closed around this with interrupted Vicryl's at the level of the dermis.  A drain was placed and brought out through the lateral aspect of the inframammary fold incision.  The skin edges were reapproximated with a running subcuticular Quill suture.  At the end of the case, sterile dressings were placed and then a sports bra.  She was taken extubated to recovery in stable condition.    PURA Ryan, was present throughout the end entire case.  She assisted with exposure as well as hemostasis.  She assisted with skin closure.  Her assistance was essential to the efficiency of this case.

## 2023-06-09 NOTE — ANESTHESIA PREPROCEDURE EVALUATION
Anesthesia Evaluation     Patient summary reviewed and Nursing notes reviewed   no history of anesthetic complications:   NPO Solid Status: > 8 hours  NPO Liquid Status: > 2 hours           Airway   Mallampati: II  TM distance: >3 FB  Neck ROM: full  No difficulty expected  Dental      Pulmonary - normal exam    breath sounds clear to auscultation  (+) a smoker Former,  Cardiovascular - normal exam  Exercise tolerance: good (4-7 METS)    ECG reviewed  Rhythm: regular  Rate: normal    (+) valvular problems/murmurs murmur      Neuro/Psych  (+) psychiatric history Anxiety and Depression  GI/Hepatic/Renal/Endo    (+) obesity    Musculoskeletal (-) negative ROS    Abdominal    Substance History - negative use     OB/GYN negative ob/gyn ROS         Other   autoimmune disease rheumatoid arthritis,     ROS/Med Hx Other: PAT Nursing Notes unavailable.                 Anesthesia Plan    ASA 3     general     (Patient understands anesthesia not responsible for dental damage.)  intravenous induction     Anesthetic plan, risks, benefits, and alternatives have been provided, discussed and informed consent has been obtained with: patient.    Use of blood products discussed with patient .    Plan discussed with CRNA.    CODE STATUS:

## 2023-06-09 NOTE — ANESTHESIA POSTPROCEDURE EVALUATION
Patient: Judith Azevedo    Procedure Summary       Date: 06/09/23 Room / Location: [facility] OR  / [facility]    Anesthesia Start: 1104 Anesthesia Stop: 1330    Procedure: BILATERAL BREAST REDUCTION (Bilateral: Chest) Diagnosis:       Macromastia      (Macromastia [N62])    Surgeons: David Paez MD Provider: Ashly Sweeney MD    Anesthesia Type: general ASA Status: 3            Anesthesia Type: general    Vitals  Vitals Value Taken Time   /62 06/09/23 1346   Temp     Pulse 81 06/09/23 1346   Resp     SpO2 95 % 06/09/23 1346   Vitals shown include unvalidated device data.        Post Anesthesia Care and Evaluation    Patient location during evaluation: bedside  Patient participation: complete - patient participated  Level of consciousness: awake  Pain management: adequate    Airway patency: patent  PONV Status: none  Cardiovascular status: acceptable and stable  Respiratory status: acceptable  Hydration status: acceptable    Comments: An Anesthesiologist personally participated in the most demanding procedures (including induction and emergence if applicable) in the anesthesia plan, monitored the course of anesthesia administration at frequent intervals and remained physically present and available for immediate diagnosis and treatment of emergencies.

## 2023-06-13 LAB
CYTO UR: NORMAL
LAB AP CASE REPORT: NORMAL
LAB AP CLINICAL INFORMATION: NORMAL
PATH REPORT.FINAL DX SPEC: NORMAL
PATH REPORT.GROSS SPEC: NORMAL

## 2023-06-15 ENCOUNTER — OFFICE VISIT (OUTPATIENT)
Dept: PLASTIC SURGERY | Facility: CLINIC | Age: 53
End: 2023-06-15
Payer: COMMERCIAL

## 2023-06-15 VITALS
SYSTOLIC BLOOD PRESSURE: 139 MMHG | TEMPERATURE: 98 F | DIASTOLIC BLOOD PRESSURE: 84 MMHG | OXYGEN SATURATION: 96 % | HEART RATE: 79 BPM | BODY MASS INDEX: 40.52 KG/M2 | WEIGHT: 220.2 LBS | HEIGHT: 62 IN

## 2023-06-15 DIAGNOSIS — Z09 POSTOPERATIVE FOLLOW-UP: Primary | ICD-10-CM

## 2023-06-15 PROCEDURE — 99024 POSTOP FOLLOW-UP VISIT: CPT | Performed by: SURGERY

## 2023-08-02 ENCOUNTER — OFFICE VISIT (OUTPATIENT)
Dept: PLASTIC SURGERY | Facility: CLINIC | Age: 53
End: 2023-08-02
Payer: COMMERCIAL

## 2023-08-02 VITALS
HEART RATE: 71 BPM | BODY MASS INDEX: 39.34 KG/M2 | TEMPERATURE: 98 F | SYSTOLIC BLOOD PRESSURE: 156 MMHG | DIASTOLIC BLOOD PRESSURE: 97 MMHG | HEIGHT: 62 IN | OXYGEN SATURATION: 97 % | WEIGHT: 213.8 LBS

## 2023-08-02 DIAGNOSIS — Z09 POSTOPERATIVE FOLLOW-UP: Primary | ICD-10-CM

## 2023-08-02 PROCEDURE — 99024 POSTOP FOLLOW-UP VISIT: CPT | Performed by: SURGERY

## 2023-08-02 PROCEDURE — 1160F RVW MEDS BY RX/DR IN RCRD: CPT | Performed by: SURGERY

## 2023-08-02 PROCEDURE — 1159F MED LIST DOCD IN RCRD: CPT | Performed by: SURGERY

## 2023-08-17 NOTE — PROGRESS NOTES
"Chief Complaint  Post-op Follow-up (2 month post op)    Subjective          History of Present Illness  Judith Azevedo is a 53 y.o. female who presents to [facility] for Postoperative Follow-Up of Bilateral Breast Reduction 6/9/23.    She is 2.5m post op and here for follow up on wounds. Doing well. Happy with results. Still has a few small open areas that are starting to close.     Allergies: Patient has no known allergies.  Allergies Reconciled.    Review of Systems   All review of system has been reviewed and it  is negative except the ones note above.     Objective     /84 (BP Location: Left arm, Patient Position: Sitting, Cuff Size: Adult)   Pulse 68   Temp 98.6 øF (37 øC) (Temporal)   Ht 157.5 cm (62.01\")   Wt 97.1 kg (214 lb)   SpO2 97%   BMI 39.13 kg/mý     Body mass index is 39.13 kg/mý.    Physical Exam   Cardiovascular: Normal rate.     Pulmonary/Chest  Effort normal.     Breast: small scabbed areas on right t junction incision, small fat necrosis area on right breast, no pain, open areas or drainage.    Result Review :       Assessment and Plan      Diagnoses and all orders for this visit:    1. Postoperative follow-up (Primary)    2. Macromastia      Plan:  Photos obtained today on handheld. Patient is aware fat necrosis on right breast will resolve just will take time with scar massage. Patient is happy with results. She does not need to wear a bra if she does not want to. Patient is aware to wear compression sports bra with physical activity. No restrictions at all. Advised patient to continue routine annual mammogram, but make sure she is 6 month post op from surgery. RTC as needed.    Scribed by Angelita Mae MA, acting as a scribe for David Paez MD, 08/18/23 09:50 EDT.  David Paez MD's signature on the note affirms that the note adequately documents the care provided.            Patient was given instructions " and counseling regarding her condition. Please see specific information pulled into the AVS if appropriate.     David Paez MD  08/18/2023

## 2023-08-18 ENCOUNTER — OFFICE VISIT (OUTPATIENT)
Dept: PLASTIC SURGERY | Facility: CLINIC | Age: 53
End: 2023-08-18
Payer: COMMERCIAL

## 2023-08-18 VITALS
HEART RATE: 68 BPM | BODY MASS INDEX: 39.38 KG/M2 | DIASTOLIC BLOOD PRESSURE: 84 MMHG | HEIGHT: 62 IN | WEIGHT: 214 LBS | TEMPERATURE: 98.6 F | OXYGEN SATURATION: 97 % | SYSTOLIC BLOOD PRESSURE: 146 MMHG

## 2023-08-18 DIAGNOSIS — N62 MACROMASTIA: ICD-10-CM

## 2023-08-18 DIAGNOSIS — Z09 POSTOPERATIVE FOLLOW-UP: Primary | ICD-10-CM

## 2023-08-18 PROCEDURE — 99024 POSTOP FOLLOW-UP VISIT: CPT | Performed by: SURGERY

## 2023-09-14 ENCOUNTER — TRANSCRIBE ORDERS (OUTPATIENT)
Dept: LAB | Facility: HOSPITAL | Age: 53
End: 2023-09-14
Payer: COMMERCIAL

## 2023-09-14 ENCOUNTER — LAB (OUTPATIENT)
Dept: LAB | Facility: HOSPITAL | Age: 53
End: 2023-09-14
Payer: COMMERCIAL

## 2023-09-14 DIAGNOSIS — M05.79 SEROPOSITIVE RHEUMATOID ARTHRITIS OF MULTIPLE SITES: Primary | ICD-10-CM

## 2023-09-14 DIAGNOSIS — M25.50 PAIN IN JOINT, MULTIPLE SITES: ICD-10-CM

## 2023-09-14 DIAGNOSIS — Z79.899 ENCOUNTER FOR LONG-TERM (CURRENT) USE OF OTHER MEDICATIONS: ICD-10-CM

## 2023-09-14 DIAGNOSIS — E55.9 VITAMIN D DEFICIENCY: ICD-10-CM

## 2023-09-14 DIAGNOSIS — M05.79 SEROPOSITIVE RHEUMATOID ARTHRITIS OF MULTIPLE SITES: ICD-10-CM

## 2023-09-14 LAB
25(OH)D3 SERPL-MCNC: 24.6 NG/ML (ref 30–100)
ALBUMIN SERPL-MCNC: 3.9 G/DL (ref 3.5–5.2)
ALBUMIN/GLOB SERPL: 1.1 G/DL
ALP SERPL-CCNC: 129 U/L (ref 39–117)
ALT SERPL W P-5'-P-CCNC: 7 U/L (ref 1–33)
ANION GAP SERPL CALCULATED.3IONS-SCNC: 11.8 MMOL/L (ref 5–15)
AST SERPL-CCNC: 12 U/L (ref 1–32)
BASOPHILS # BLD AUTO: 0.05 10*3/MM3 (ref 0–0.2)
BASOPHILS NFR BLD AUTO: 0.6 % (ref 0–1.5)
BILIRUB SERPL-MCNC: 0.2 MG/DL (ref 0–1.2)
BUN SERPL-MCNC: 19 MG/DL (ref 6–20)
BUN/CREAT SERPL: 29.7 (ref 7–25)
CALCIUM SPEC-SCNC: 9.6 MG/DL (ref 8.6–10.5)
CHLORIDE SERPL-SCNC: 102 MMOL/L (ref 98–107)
CO2 SERPL-SCNC: 24.2 MMOL/L (ref 22–29)
CREAT SERPL-MCNC: 0.64 MG/DL (ref 0.57–1)
CRP SERPL-MCNC: 15.73 MG/DL (ref 0–0.5)
DEPRECATED RDW RBC AUTO: 44.6 FL (ref 37–54)
EGFRCR SERPLBLD CKD-EPI 2021: 107.1 ML/MIN/1.73
EOSINOPHIL # BLD AUTO: 0.35 10*3/MM3 (ref 0–0.4)
EOSINOPHIL NFR BLD AUTO: 3.9 % (ref 0.3–6.2)
ERYTHROCYTE [DISTWIDTH] IN BLOOD BY AUTOMATED COUNT: 16.7 % (ref 12.3–15.4)
GLOBULIN UR ELPH-MCNC: 3.7 GM/DL
GLUCOSE SERPL-MCNC: 91 MG/DL (ref 65–99)
HCT VFR BLD AUTO: 30.9 % (ref 34–46.6)
HGB BLD-MCNC: 9.4 G/DL (ref 12–15.9)
IMM GRANULOCYTES # BLD AUTO: 0.04 10*3/MM3 (ref 0–0.05)
IMM GRANULOCYTES NFR BLD AUTO: 0.4 % (ref 0–0.5)
LYMPHOCYTES # BLD AUTO: 2.56 10*3/MM3 (ref 0.7–3.1)
LYMPHOCYTES NFR BLD AUTO: 28.8 % (ref 19.6–45.3)
MCH RBC QN AUTO: 22.9 PG (ref 26.6–33)
MCHC RBC AUTO-ENTMCNC: 30.4 G/DL (ref 31.5–35.7)
MCV RBC AUTO: 75.4 FL (ref 79–97)
MONOCYTES # BLD AUTO: 0.5 10*3/MM3 (ref 0.1–0.9)
MONOCYTES NFR BLD AUTO: 5.6 % (ref 5–12)
NEUTROPHILS NFR BLD AUTO: 5.4 10*3/MM3 (ref 1.7–7)
NEUTROPHILS NFR BLD AUTO: 60.7 % (ref 42.7–76)
NRBC BLD AUTO-RTO: 0 /100 WBC (ref 0–0.2)
PLATELET # BLD AUTO: 478 10*3/MM3 (ref 140–450)
PMV BLD AUTO: 9.3 FL (ref 6–12)
POTASSIUM SERPL-SCNC: 4.5 MMOL/L (ref 3.5–5.2)
PROT SERPL-MCNC: 7.6 G/DL (ref 6–8.5)
RBC # BLD AUTO: 4.1 10*6/MM3 (ref 3.77–5.28)
SODIUM SERPL-SCNC: 138 MMOL/L (ref 136–145)
WBC NRBC COR # BLD: 8.9 10*3/MM3 (ref 3.4–10.8)

## 2023-09-14 PROCEDURE — 86140 C-REACTIVE PROTEIN: CPT

## 2023-09-14 PROCEDURE — 36415 COLL VENOUS BLD VENIPUNCTURE: CPT

## 2023-09-14 PROCEDURE — 80053 COMPREHEN METABOLIC PANEL: CPT

## 2023-09-14 PROCEDURE — 82306 VITAMIN D 25 HYDROXY: CPT

## 2023-09-14 PROCEDURE — 85025 COMPLETE CBC W/AUTO DIFF WBC: CPT

## 2024-01-05 DIAGNOSIS — Z79.4 ENCOUNTER FOR LONG-TERM (CURRENT) USE OF INSULIN: Primary | ICD-10-CM

## 2024-04-11 ENCOUNTER — LAB (OUTPATIENT)
Dept: LAB | Facility: HOSPITAL | Age: 54
End: 2024-04-11
Payer: COMMERCIAL

## 2024-04-11 DIAGNOSIS — Z79.4 ENCOUNTER FOR LONG-TERM (CURRENT) USE OF INSULIN: Primary | ICD-10-CM

## 2024-04-11 DIAGNOSIS — Z79.4 ENCOUNTER FOR LONG-TERM (CURRENT) USE OF INSULIN: ICD-10-CM

## 2024-04-11 PROCEDURE — 85025 COMPLETE CBC W/AUTO DIFF WBC: CPT

## 2024-04-11 PROCEDURE — 80053 COMPREHEN METABOLIC PANEL: CPT

## 2024-04-11 PROCEDURE — 83540 ASSAY OF IRON: CPT

## 2024-04-11 PROCEDURE — 86140 C-REACTIVE PROTEIN: CPT

## 2024-04-11 PROCEDURE — 84466 ASSAY OF TRANSFERRIN: CPT

## 2024-04-11 PROCEDURE — 36415 COLL VENOUS BLD VENIPUNCTURE: CPT

## 2024-04-12 LAB
ALBUMIN SERPL-MCNC: 4.6 G/DL (ref 3.5–5.2)
ALBUMIN/GLOB SERPL: 1.5 G/DL
ALP SERPL-CCNC: 100 U/L (ref 39–117)
ALT SERPL W P-5'-P-CCNC: 8 U/L (ref 1–33)
ANION GAP SERPL CALCULATED.3IONS-SCNC: 9 MMOL/L (ref 5–15)
AST SERPL-CCNC: 11 U/L (ref 1–32)
BASOPHILS # BLD AUTO: 0.05 10*3/MM3 (ref 0–0.2)
BASOPHILS NFR BLD AUTO: 0.5 % (ref 0–1.5)
BILIRUB SERPL-MCNC: 0.2 MG/DL (ref 0–1.2)
BUN SERPL-MCNC: 13 MG/DL (ref 6–20)
BUN/CREAT SERPL: 16.5 (ref 7–25)
CALCIUM SPEC-SCNC: 9.5 MG/DL (ref 8.6–10.5)
CHLORIDE SERPL-SCNC: 105 MMOL/L (ref 98–107)
CO2 SERPL-SCNC: 27 MMOL/L (ref 22–29)
CREAT SERPL-MCNC: 0.79 MG/DL (ref 0.57–1)
CRP SERPL-MCNC: 2.08 MG/DL (ref 0–0.5)
DEPRECATED RDW RBC AUTO: 46.2 FL (ref 37–54)
EGFRCR SERPLBLD CKD-EPI 2021: 90.7 ML/MIN/1.73
EOSINOPHIL # BLD AUTO: 0.46 10*3/MM3 (ref 0–0.4)
EOSINOPHIL NFR BLD AUTO: 4.3 % (ref 0.3–6.2)
ERYTHROCYTE [DISTWIDTH] IN BLOOD BY AUTOMATED COUNT: 14.5 % (ref 12.3–15.4)
GLOBULIN UR ELPH-MCNC: 3.1 GM/DL
GLUCOSE SERPL-MCNC: 91 MG/DL (ref 65–99)
HCT VFR BLD AUTO: 39.5 % (ref 34–46.6)
HGB BLD-MCNC: 13.1 G/DL (ref 12–15.9)
IMM GRANULOCYTES # BLD AUTO: 0.04 10*3/MM3 (ref 0–0.05)
IMM GRANULOCYTES NFR BLD AUTO: 0.4 % (ref 0–0.5)
IRON 24H UR-MRATE: 33 MCG/DL (ref 37–145)
IRON SATN MFR SERPL: 8 % (ref 20–50)
LYMPHOCYTES # BLD AUTO: 3.24 10*3/MM3 (ref 0.7–3.1)
LYMPHOCYTES NFR BLD AUTO: 30.2 % (ref 19.6–45.3)
MCH RBC QN AUTO: 29.2 PG (ref 26.6–33)
MCHC RBC AUTO-ENTMCNC: 33.2 G/DL (ref 31.5–35.7)
MCV RBC AUTO: 88.2 FL (ref 79–97)
MONOCYTES # BLD AUTO: 0.52 10*3/MM3 (ref 0.1–0.9)
MONOCYTES NFR BLD AUTO: 4.8 % (ref 5–12)
NEUTROPHILS NFR BLD AUTO: 59.8 % (ref 42.7–76)
NEUTROPHILS NFR BLD AUTO: 6.43 10*3/MM3 (ref 1.7–7)
NRBC BLD AUTO-RTO: 0 /100 WBC (ref 0–0.2)
PLATELET # BLD AUTO: 390 10*3/MM3 (ref 140–450)
PMV BLD AUTO: 10.2 FL (ref 6–12)
POTASSIUM SERPL-SCNC: 5.1 MMOL/L (ref 3.5–5.2)
PROT SERPL-MCNC: 7.7 G/DL (ref 6–8.5)
RBC # BLD AUTO: 4.48 10*6/MM3 (ref 3.77–5.28)
SODIUM SERPL-SCNC: 141 MMOL/L (ref 136–145)
TIBC SERPL-MCNC: 407 MCG/DL (ref 298–536)
TRANSFERRIN SERPL-MCNC: 273 MG/DL (ref 200–360)
WBC NRBC COR # BLD AUTO: 10.74 10*3/MM3 (ref 3.4–10.8)

## 2024-10-31 ENCOUNTER — LAB (OUTPATIENT)
Dept: LAB | Facility: HOSPITAL | Age: 54
End: 2024-10-31
Payer: COMMERCIAL

## 2024-10-31 DIAGNOSIS — Z79.4 ENCOUNTER FOR LONG-TERM (CURRENT) USE OF INSULIN: Primary | ICD-10-CM

## 2024-10-31 DIAGNOSIS — Z79.4 ENCOUNTER FOR LONG-TERM (CURRENT) USE OF INSULIN: ICD-10-CM

## 2024-10-31 LAB
ALBUMIN SERPL-MCNC: 4.6 G/DL (ref 3.5–5.2)
ALBUMIN/GLOB SERPL: 1.4 G/DL
ALP SERPL-CCNC: 97 U/L (ref 39–117)
ALT SERPL W P-5'-P-CCNC: 20 U/L (ref 1–33)
ANION GAP SERPL CALCULATED.3IONS-SCNC: 10 MMOL/L (ref 5–15)
AST SERPL-CCNC: 22 U/L (ref 1–32)
BASOPHILS # BLD AUTO: 0.07 10*3/MM3 (ref 0–0.2)
BASOPHILS NFR BLD AUTO: 0.6 % (ref 0–1.5)
BILIRUB SERPL-MCNC: 0.4 MG/DL (ref 0–1.2)
BUN SERPL-MCNC: 13 MG/DL (ref 6–20)
BUN/CREAT SERPL: 16.9 (ref 7–25)
CALCIUM SPEC-SCNC: 10.2 MG/DL (ref 8.6–10.5)
CHLORIDE SERPL-SCNC: 104 MMOL/L (ref 98–107)
CO2 SERPL-SCNC: 26 MMOL/L (ref 22–29)
CREAT SERPL-MCNC: 0.77 MG/DL (ref 0.57–1)
CRP SERPL-MCNC: 0.92 MG/DL (ref 0–0.5)
DEPRECATED RDW RBC AUTO: 49.1 FL (ref 37–54)
EGFRCR SERPLBLD CKD-EPI 2021: 92.9 ML/MIN/1.73
EOSINOPHIL # BLD AUTO: 0.54 10*3/MM3 (ref 0–0.4)
EOSINOPHIL NFR BLD AUTO: 4.6 % (ref 0.3–6.2)
ERYTHROCYTE [DISTWIDTH] IN BLOOD BY AUTOMATED COUNT: 14.6 % (ref 12.3–15.4)
GLOBULIN UR ELPH-MCNC: 3.4 GM/DL
GLUCOSE SERPL-MCNC: 97 MG/DL (ref 65–99)
HCT VFR BLD AUTO: 44.5 % (ref 34–46.6)
HGB BLD-MCNC: 14.5 G/DL (ref 12–15.9)
IMM GRANULOCYTES # BLD AUTO: 0.04 10*3/MM3 (ref 0–0.05)
IMM GRANULOCYTES NFR BLD AUTO: 0.3 % (ref 0–0.5)
LYMPHOCYTES # BLD AUTO: 4.43 10*3/MM3 (ref 0.7–3.1)
LYMPHOCYTES NFR BLD AUTO: 37.7 % (ref 19.6–45.3)
MCH RBC QN AUTO: 29.9 PG (ref 26.6–33)
MCHC RBC AUTO-ENTMCNC: 32.6 G/DL (ref 31.5–35.7)
MCV RBC AUTO: 91.8 FL (ref 79–97)
MONOCYTES # BLD AUTO: 0.51 10*3/MM3 (ref 0.1–0.9)
MONOCYTES NFR BLD AUTO: 4.3 % (ref 5–12)
NEUTROPHILS NFR BLD AUTO: 52.5 % (ref 42.7–76)
NEUTROPHILS NFR BLD AUTO: 6.16 10*3/MM3 (ref 1.7–7)
NRBC BLD AUTO-RTO: 0 /100 WBC (ref 0–0.2)
PLATELET # BLD AUTO: 351 10*3/MM3 (ref 140–450)
PMV BLD AUTO: 10 FL (ref 6–12)
POTASSIUM SERPL-SCNC: 4.6 MMOL/L (ref 3.5–5.2)
PROT SERPL-MCNC: 8 G/DL (ref 6–8.5)
RBC # BLD AUTO: 4.85 10*6/MM3 (ref 3.77–5.28)
SODIUM SERPL-SCNC: 140 MMOL/L (ref 136–145)
WBC NRBC COR # BLD AUTO: 11.75 10*3/MM3 (ref 3.4–10.8)

## 2024-10-31 PROCEDURE — 80053 COMPREHEN METABOLIC PANEL: CPT

## 2024-10-31 PROCEDURE — 36415 COLL VENOUS BLD VENIPUNCTURE: CPT

## 2024-10-31 PROCEDURE — 85025 COMPLETE CBC W/AUTO DIFF WBC: CPT

## 2024-10-31 PROCEDURE — 86140 C-REACTIVE PROTEIN: CPT

## (undated) DEVICE — INTENDED FOR TISSUE SEPARATION, AND OTHER PROCEDURES THAT REQUIRE A SHARP SURGICAL BLADE TO PUNCTURE OR CUT.: Brand: BARD-PARKER ® CARBON RIB-BACK BLADES

## (undated) DEVICE — SUT VIC 2/0 CT1 36IN

## (undated) DEVICE — GOWN,REINFORCE,POLY,SIRUS,BREATH SLV,XLG: Brand: MEDLINE

## (undated) DEVICE — NON-WOVEN ADHESIVE WOUND DRESSING: Brand: PRIMAPORE ADHESIVE WOUND DRSG 7.2*5CM

## (undated) DEVICE — SPNG LAP PREWSH SFTPK 18X18IN STRL PK/5

## (undated) DEVICE — COVER,LIGHT HANDLE,FLX,1/PK: Brand: MEDLINE INDUSTRIES, INC.

## (undated) DEVICE — DRAIN JACKSON PRATT 10MM 3/4PR: Brand: CARDINAL HEALTH

## (undated) DEVICE — SYR LL TP 10ML STRL

## (undated) DEVICE — PAD,ABDOMINAL,8"X10",ST,LF: Brand: MEDLINE

## (undated) DEVICE — CVR HNDL LT SURG ACCSSRY BLU STRL

## (undated) DEVICE — GLV SURG SENSICARE PI PF LF 7 GRN STRL

## (undated) DEVICE — JACKSON-PRATT 100CC BULB RESERVOIR: Brand: CARDINAL HEALTH

## (undated) DEVICE — PROXIMATE RH ROTATING HEAD SKIN STAPLERS (35 WIDE) CONTAINS 35 STAINLESS STEEL STAPLES: Brand: PROXIMATE

## (undated) DEVICE — PENCL E/S SMOKEEVAC W/TELESCP CANN

## (undated) DEVICE — GLV SURG BIOGEL LTX PF 7 1/2

## (undated) DEVICE — NDL HYPO ECLPS SFTY 25G 1 1/2IN

## (undated) DEVICE — SLV SCD KN/LEN ADJ EXPRSS BLENDED MD 1P/U

## (undated) DEVICE — PLASTIC MAJOR-LF: Brand: MEDLINE INDUSTRIES, INC.

## (undated) DEVICE — STERILE POLYISOPRENE POWDER-FREE SURGICAL GLOVES WITH EMOLLIENT COATING: Brand: PROTEXIS

## (undated) DEVICE — CONTAINER,SPECIMEN,O.R.STRL,4.5OZ: Brand: MEDLINE

## (undated) DEVICE — BIOPATCH™ ANTIMICROBIAL DRESSING WITH CHLORHEXIDINE GLUCONATE IS A HYDROPHILLIC POLYURETHANE ABSORPTIVE FOAM WITH CHLORHEXIDINE GLUCONATE (CHG) WHICH INHIBITS BACTERIAL GROWTH UNDER THE DRESSING. THE DRESSING IS INTENDED TO BE USED TO ABSORB EXUDATE, COVER A WOUND CAUSED BY VASCULAR AND NONVASCULAR PERCUTANEOUS MEDICAL DEVICES DURING SURGERY, AS WELL AS REDUCE LOCAL INFECTION AND COLONIZATION OF MICROORGANISMS.: Brand: BIOPATCH

## (undated) DEVICE — GLV SURG SENSICARE SLT PF LF 6.5 STRL

## (undated) DEVICE — SUT MNCRYL PLS ANTIB UD 3/0 PS2 27IN

## (undated) DEVICE — MARKR SKIN W/RULR AND LBL